# Patient Record
Sex: FEMALE | Race: WHITE | NOT HISPANIC OR LATINO | Employment: FULL TIME | URBAN - METROPOLITAN AREA
[De-identification: names, ages, dates, MRNs, and addresses within clinical notes are randomized per-mention and may not be internally consistent; named-entity substitution may affect disease eponyms.]

---

## 2017-02-01 ENCOUNTER — APPOINTMENT (OUTPATIENT)
Dept: LAB | Facility: HOSPITAL | Age: 37
End: 2017-02-01
Payer: COMMERCIAL

## 2017-02-01 ENCOUNTER — TRANSCRIBE ORDERS (OUTPATIENT)
Dept: ADMINISTRATIVE | Facility: HOSPITAL | Age: 37
End: 2017-02-01

## 2017-02-01 DIAGNOSIS — R30.0 DYSURIA: Primary | ICD-10-CM

## 2017-02-01 DIAGNOSIS — R30.0 DYSURIA: ICD-10-CM

## 2017-02-01 DIAGNOSIS — N20.9 URINARY CALCULUS, UNSPECIFIED: ICD-10-CM

## 2017-02-01 LAB
BACTERIA UR QL AUTO: ABNORMAL /HPF
BILIRUB UR QL STRIP: NEGATIVE
CLARITY UR: CLEAR
COLOR UR: YELLOW
GLUCOSE UR STRIP-MCNC: NEGATIVE MG/DL
HGB UR QL STRIP.AUTO: ABNORMAL
HYALINE CASTS #/AREA URNS LPF: ABNORMAL /LPF
KETONES UR STRIP-MCNC: NEGATIVE MG/DL
LEUKOCYTE ESTERASE UR QL STRIP: NEGATIVE
NITRITE UR QL STRIP: NEGATIVE
NON-SQ EPI CELLS URNS QL MICRO: ABNORMAL /HPF
PH UR STRIP.AUTO: 6 [PH] (ref 4.5–8)
PROT UR STRIP-MCNC: NEGATIVE MG/DL
RBC #/AREA URNS AUTO: ABNORMAL /HPF
SP GR UR STRIP.AUTO: 1.02 (ref 1–1.03)
UROBILINOGEN UR QL STRIP.AUTO: 0.2 E.U./DL
WBC #/AREA URNS AUTO: ABNORMAL /HPF

## 2017-02-01 PROCEDURE — 87086 URINE CULTURE/COLONY COUNT: CPT

## 2017-02-01 PROCEDURE — 81001 URINALYSIS AUTO W/SCOPE: CPT | Performed by: INTERNAL MEDICINE

## 2017-02-02 ENCOUNTER — HOSPITAL ENCOUNTER (OUTPATIENT)
Dept: RADIOLOGY | Facility: HOSPITAL | Age: 37
Discharge: HOME | End: 2017-02-02
Payer: COMMERCIAL

## 2017-02-02 DIAGNOSIS — R30.0 DYSURIA: ICD-10-CM

## 2017-02-02 LAB — BACTERIA UR CULT: NORMAL

## 2017-02-02 PROCEDURE — 76830 TRANSVAGINAL US NON-OB: CPT

## 2017-02-02 PROCEDURE — 76856 US EXAM PELVIC COMPLETE: CPT

## 2017-02-07 ENCOUNTER — HOSPITAL ENCOUNTER (OUTPATIENT)
Dept: RADIOLOGY | Age: 37
Discharge: HOME | End: 2017-02-07
Payer: COMMERCIAL

## 2017-02-07 DIAGNOSIS — N20.9 URINARY CALCULUS, UNSPECIFIED: ICD-10-CM

## 2017-02-07 PROCEDURE — 76770 US EXAM ABDO BACK WALL COMP: CPT

## 2018-03-10 ENCOUNTER — APPOINTMENT (OUTPATIENT)
Dept: LAB | Facility: HOSPITAL | Age: 38
End: 2018-03-10
Payer: COMMERCIAL

## 2018-03-10 ENCOUNTER — TRANSCRIBE ORDERS (OUTPATIENT)
Dept: LAB | Facility: HOSPITAL | Age: 38
End: 2018-03-10

## 2018-03-10 DIAGNOSIS — I51.9 MYXEDEMA HEART DISEASE: Primary | ICD-10-CM

## 2018-03-10 DIAGNOSIS — E03.9 MYXEDEMA HEART DISEASE: ICD-10-CM

## 2018-03-10 DIAGNOSIS — E03.9 MYXEDEMA HEART DISEASE: Primary | ICD-10-CM

## 2018-03-10 DIAGNOSIS — I51.9 MYXEDEMA HEART DISEASE: ICD-10-CM

## 2018-03-10 LAB
ALBUMIN SERPL BCP-MCNC: 3.6 G/DL (ref 3.5–5)
ALP SERPL-CCNC: 102 U/L (ref 46–116)
ALT SERPL W P-5'-P-CCNC: 19 U/L (ref 12–78)
ANION GAP SERPL CALCULATED.3IONS-SCNC: 5 MMOL/L (ref 4–13)
AST SERPL W P-5'-P-CCNC: 13 U/L (ref 5–45)
BILIRUB SERPL-MCNC: 0.48 MG/DL (ref 0.2–1)
BILIRUB UR QL STRIP: NEGATIVE
BUN SERPL-MCNC: 15 MG/DL (ref 5–25)
CALCIUM SERPL-MCNC: 8.7 MG/DL (ref 8.3–10.1)
CHLORIDE SERPL-SCNC: 106 MMOL/L (ref 100–108)
CLARITY UR: CLEAR
CO2 SERPL-SCNC: 29 MMOL/L (ref 21–32)
COLOR UR: YELLOW
CREAT SERPL-MCNC: 0.73 MG/DL (ref 0.6–1.3)
ERYTHROCYTE [DISTWIDTH] IN BLOOD BY AUTOMATED COUNT: 14.1 % (ref 11.6–15.1)
GFR SERPL CREATININE-BSD FRML MDRD: 106 ML/MIN/1.73SQ M
GLUCOSE P FAST SERPL-MCNC: 87 MG/DL (ref 65–99)
GLUCOSE UR STRIP-MCNC: NEGATIVE MG/DL
HCT VFR BLD AUTO: 44 % (ref 34.8–46.1)
HGB BLD-MCNC: 15 G/DL (ref 11.5–15.4)
HGB UR QL STRIP.AUTO: NEGATIVE
KETONES UR STRIP-MCNC: NEGATIVE MG/DL
LEUKOCYTE ESTERASE UR QL STRIP: NEGATIVE
MCH RBC QN AUTO: 30.3 PG (ref 26.8–34.3)
MCHC RBC AUTO-ENTMCNC: 34.1 G/DL (ref 31.4–37.4)
MCV RBC AUTO: 89 FL (ref 82–98)
NITRITE UR QL STRIP: NEGATIVE
PH UR STRIP.AUTO: 6.5 [PH] (ref 4.5–8)
PLATELET # BLD AUTO: 290 THOUSANDS/UL (ref 149–390)
PMV BLD AUTO: 10.9 FL (ref 8.9–12.7)
POTASSIUM SERPL-SCNC: 4.3 MMOL/L (ref 3.5–5.3)
PROT SERPL-MCNC: 7.4 G/DL (ref 6.4–8.2)
PROT UR STRIP-MCNC: NEGATIVE MG/DL
RBC # BLD AUTO: 4.95 MILLION/UL (ref 3.81–5.12)
SODIUM SERPL-SCNC: 140 MMOL/L (ref 136–145)
SP GR UR STRIP.AUTO: 1.02 (ref 1–1.03)
TSH SERPL DL<=0.05 MIU/L-ACNC: 1.36 UIU/ML (ref 0.36–3.74)
UROBILINOGEN UR QL STRIP.AUTO: 0.2 E.U./DL
WBC # BLD AUTO: 11.02 THOUSAND/UL (ref 4.31–10.16)

## 2018-03-10 PROCEDURE — 80053 COMPREHEN METABOLIC PANEL: CPT

## 2018-03-10 PROCEDURE — 81003 URINALYSIS AUTO W/O SCOPE: CPT

## 2018-03-10 PROCEDURE — 84443 ASSAY THYROID STIM HORMONE: CPT

## 2018-03-10 PROCEDURE — 36415 COLL VENOUS BLD VENIPUNCTURE: CPT

## 2018-03-10 PROCEDURE — 85027 COMPLETE CBC AUTOMATED: CPT

## 2018-08-13 ENCOUNTER — APPOINTMENT (EMERGENCY)
Dept: RADIOLOGY | Facility: HOSPITAL | Age: 38
DRG: 153 | End: 2018-08-13
Payer: COMMERCIAL

## 2018-08-13 ENCOUNTER — HOSPITAL ENCOUNTER (INPATIENT)
Facility: HOSPITAL | Age: 38
LOS: 2 days | Discharge: HOME | DRG: 153 | End: 2018-08-16
Attending: EMERGENCY MEDICINE | Admitting: INTERNAL MEDICINE
Payer: COMMERCIAL

## 2018-08-13 DIAGNOSIS — J45.901 ASTHMA WITH ACUTE EXACERBATION: ICD-10-CM

## 2018-08-13 DIAGNOSIS — J45.901 ASTHMA EXACERBATION, NON-ALLERGIC, UNSPECIFIED ASTHMA SEVERITY: Primary | ICD-10-CM

## 2018-08-13 DIAGNOSIS — J06.9 URI, ACUTE: ICD-10-CM

## 2018-08-13 PROBLEM — E66.9 OBESE: Status: ACTIVE | Noted: 2018-08-13

## 2018-08-13 PROBLEM — R73.9 HYPERGLYCEMIA: Status: ACTIVE | Noted: 2018-08-13

## 2018-08-13 PROBLEM — Z72.0 TOBACCO ABUSE: Status: ACTIVE | Noted: 2018-08-13

## 2018-08-13 PROBLEM — R05.9 COUGH: Status: ACTIVE | Noted: 2018-08-13

## 2018-08-13 LAB
ANION GAP SERPL CALCULATED.3IONS-SCNC: 7 MMOL/L (ref 4–13)
BASOPHILS # BLD AUTO: 0.07 THOUSANDS/ΜL (ref 0–0.1)
BASOPHILS NFR BLD AUTO: 1 % (ref 0–1)
BUN SERPL-MCNC: 18 MG/DL (ref 5–25)
CALCIUM SERPL-MCNC: 8.7 MG/DL (ref 8.3–10.1)
CHLORIDE SERPL-SCNC: 107 MMOL/L (ref 100–108)
CO2 SERPL-SCNC: 25 MMOL/L (ref 21–32)
CREAT SERPL-MCNC: 0.8 MG/DL (ref 0.6–1.3)
EOSINOPHIL # BLD AUTO: 0.13 THOUSAND/ΜL (ref 0–0.61)
EOSINOPHIL NFR BLD AUTO: 1 % (ref 0–6)
ERYTHROCYTE [DISTWIDTH] IN BLOOD BY AUTOMATED COUNT: 14.3 % (ref 11.6–15.1)
GFR SERPL CREATININE-BSD FRML MDRD: 94 ML/MIN/1.73SQ M
GLUCOSE SERPL-MCNC: 155 MG/DL (ref 65–140)
GLUCOSE SERPL-MCNC: 184 MG/DL (ref 65–140)
HCT VFR BLD AUTO: 43.7 % (ref 34.8–46.1)
HGB BLD-MCNC: 14.5 G/DL (ref 11.5–15.4)
IMM GRANULOCYTES # BLD AUTO: 0.03 THOUSAND/UL (ref 0–0.2)
IMM GRANULOCYTES NFR BLD AUTO: 0 % (ref 0–2)
LYMPHOCYTES # BLD AUTO: 2.25 THOUSANDS/ΜL (ref 0.6–4.47)
LYMPHOCYTES NFR BLD AUTO: 23 % (ref 14–44)
MCH RBC QN AUTO: 29.6 PG (ref 26.8–34.3)
MCHC RBC AUTO-ENTMCNC: 33.2 G/DL (ref 31.4–37.4)
MCV RBC AUTO: 89 FL (ref 82–98)
MONOCYTES # BLD AUTO: 0.44 THOUSAND/ΜL (ref 0.17–1.22)
MONOCYTES NFR BLD AUTO: 5 % (ref 4–12)
NEUTROPHILS # BLD AUTO: 6.75 THOUSANDS/ΜL (ref 1.85–7.62)
NEUTS SEG NFR BLD AUTO: 70 % (ref 43–75)
NRBC BLD AUTO-RTO: 0 /100 WBCS
PLATELET # BLD AUTO: 262 THOUSANDS/UL (ref 149–390)
PLATELET # BLD AUTO: 287 THOUSANDS/UL (ref 149–390)
PMV BLD AUTO: 11.1 FL (ref 8.9–12.7)
PMV BLD AUTO: 11.1 FL (ref 8.9–12.7)
POTASSIUM SERPL-SCNC: 3.5 MMOL/L (ref 3.5–5.3)
RBC # BLD AUTO: 4.9 MILLION/UL (ref 3.81–5.12)
SODIUM SERPL-SCNC: 139 MMOL/L (ref 136–145)
WBC # BLD AUTO: 9.67 THOUSAND/UL (ref 4.31–10.16)

## 2018-08-13 PROCEDURE — 71046 X-RAY EXAM CHEST 2 VIEWS: CPT

## 2018-08-13 PROCEDURE — 71250 CT THORAX DX C-: CPT

## 2018-08-13 PROCEDURE — 85049 AUTOMATED PLATELET COUNT: CPT | Performed by: INTERNAL MEDICINE

## 2018-08-13 PROCEDURE — 87493 C DIFF AMPLIFIED PROBE: CPT | Performed by: INTERNAL MEDICINE

## 2018-08-13 PROCEDURE — 96374 THER/PROPH/DIAG INJ IV PUSH: CPT

## 2018-08-13 PROCEDURE — 85025 COMPLETE CBC W/AUTO DIFF WBC: CPT | Performed by: PHYSICIAN ASSISTANT

## 2018-08-13 PROCEDURE — 81025 URINE PREGNANCY TEST: CPT | Performed by: EMERGENCY MEDICINE

## 2018-08-13 PROCEDURE — 94640 AIRWAY INHALATION TREATMENT: CPT

## 2018-08-13 PROCEDURE — 36415 COLL VENOUS BLD VENIPUNCTURE: CPT | Performed by: PHYSICIAN ASSISTANT

## 2018-08-13 PROCEDURE — 94760 N-INVAS EAR/PLS OXIMETRY 1: CPT

## 2018-08-13 PROCEDURE — 99220 PR INITIAL OBSERVATION CARE/DAY 70 MINUTES: CPT | Performed by: INTERNAL MEDICINE

## 2018-08-13 PROCEDURE — 99285 EMERGENCY DEPT VISIT HI MDM: CPT

## 2018-08-13 PROCEDURE — 82948 REAGENT STRIP/BLOOD GLUCOSE: CPT

## 2018-08-13 PROCEDURE — 80048 BASIC METABOLIC PNL TOTAL CA: CPT | Performed by: PHYSICIAN ASSISTANT

## 2018-08-13 RX ORDER — ALBUTEROL SULFATE 90 UG/1
2 AEROSOL, METERED RESPIRATORY (INHALATION) EVERY 4 HOURS PRN
Status: DISCONTINUED | OUTPATIENT
Start: 2018-08-13 | End: 2018-08-16 | Stop reason: HOSPADM

## 2018-08-13 RX ORDER — ACETAMINOPHEN 325 MG/1
650 TABLET ORAL EVERY 6 HOURS PRN
Status: DISCONTINUED | OUTPATIENT
Start: 2018-08-13 | End: 2018-08-16 | Stop reason: HOSPADM

## 2018-08-13 RX ORDER — ALBUTEROL SULFATE 2.5 MG/3ML
5 SOLUTION RESPIRATORY (INHALATION) ONCE
Status: COMPLETED | OUTPATIENT
Start: 2018-08-13 | End: 2018-08-13

## 2018-08-13 RX ORDER — BENZONATATE 100 MG/1
100 CAPSULE ORAL 3 TIMES DAILY PRN
Status: DISCONTINUED | OUTPATIENT
Start: 2018-08-13 | End: 2018-08-16 | Stop reason: HOSPADM

## 2018-08-13 RX ORDER — ALBUTEROL SULFATE 90 UG/1
2 AEROSOL, METERED RESPIRATORY (INHALATION) EVERY 6 HOURS PRN
Status: DISCONTINUED | OUTPATIENT
Start: 2018-08-13 | End: 2018-08-13

## 2018-08-13 RX ORDER — GUAIFENESIN 600 MG
600 TABLET, EXTENDED RELEASE 12 HR ORAL EVERY 12 HOURS SCHEDULED
Status: DISCONTINUED | OUTPATIENT
Start: 2018-08-13 | End: 2018-08-16 | Stop reason: HOSPADM

## 2018-08-13 RX ORDER — LEVALBUTEROL 1.25 MG/.5ML
1.25 SOLUTION, CONCENTRATE RESPIRATORY (INHALATION) EVERY 8 HOURS PRN
Status: DISCONTINUED | OUTPATIENT
Start: 2018-08-13 | End: 2018-08-16 | Stop reason: HOSPADM

## 2018-08-13 RX ORDER — AZITHROMYCIN 250 MG/1
250 TABLET, FILM COATED ORAL EVERY 24 HOURS
COMMUNITY
End: 2018-08-16 | Stop reason: HOSPADM

## 2018-08-13 RX ORDER — NICOTINE 21 MG/24HR
1 PATCH, TRANSDERMAL 24 HOURS TRANSDERMAL DAILY
Status: DISCONTINUED | OUTPATIENT
Start: 2018-08-13 | End: 2018-08-16 | Stop reason: HOSPADM

## 2018-08-13 RX ORDER — ONDANSETRON 2 MG/ML
4 INJECTION INTRAMUSCULAR; INTRAVENOUS EVERY 6 HOURS PRN
Status: DISCONTINUED | OUTPATIENT
Start: 2018-08-13 | End: 2018-08-16 | Stop reason: HOSPADM

## 2018-08-13 RX ORDER — LEVALBUTEROL 1.25 MG/.5ML
1.25 SOLUTION, CONCENTRATE RESPIRATORY (INHALATION)
Status: DISCONTINUED | OUTPATIENT
Start: 2018-08-13 | End: 2018-08-16 | Stop reason: HOSPADM

## 2018-08-13 RX ORDER — MAGNESIUM HYDROXIDE/ALUMINUM HYDROXICE/SIMETHICONE 120; 1200; 1200 MG/30ML; MG/30ML; MG/30ML
5 SUSPENSION ORAL EVERY 6 HOURS PRN
Status: DISCONTINUED | OUTPATIENT
Start: 2018-08-13 | End: 2018-08-16 | Stop reason: HOSPADM

## 2018-08-13 RX ORDER — SODIUM CHLORIDE FOR INHALATION 0.9 %
3 VIAL, NEBULIZER (ML) INHALATION ONCE
Status: COMPLETED | OUTPATIENT
Start: 2018-08-13 | End: 2018-08-13

## 2018-08-13 RX ORDER — DEXAMETHASONE SODIUM PHOSPHATE 10 MG/ML
10 INJECTION, SOLUTION INTRAMUSCULAR; INTRAVENOUS ONCE
Status: COMPLETED | OUTPATIENT
Start: 2018-08-13 | End: 2018-08-13

## 2018-08-13 RX ORDER — TRAMADOL HYDROCHLORIDE 50 MG/1
50 TABLET ORAL EVERY 6 HOURS PRN
Status: DISCONTINUED | OUTPATIENT
Start: 2018-08-13 | End: 2018-08-16 | Stop reason: HOSPADM

## 2018-08-13 RX ORDER — VARENICLINE TARTRATE 25 MG
1 KIT ORAL 2 TIMES DAILY
COMMUNITY
End: 2020-03-18 | Stop reason: CLARIF

## 2018-08-13 RX ORDER — DOCUSATE SODIUM 100 MG/1
100 CAPSULE, LIQUID FILLED ORAL 2 TIMES DAILY
Status: DISCONTINUED | OUTPATIENT
Start: 2018-08-13 | End: 2018-08-16 | Stop reason: HOSPADM

## 2018-08-13 RX ORDER — ALBUTEROL SULFATE 90 UG/1
2 AEROSOL, METERED RESPIRATORY (INHALATION) EVERY 6 HOURS PRN
COMMUNITY
End: 2020-03-18 | Stop reason: CLARIF

## 2018-08-13 RX ORDER — METHYLPREDNISOLONE SODIUM SUCCINATE 40 MG/ML
40 INJECTION, POWDER, LYOPHILIZED, FOR SOLUTION INTRAMUSCULAR; INTRAVENOUS EVERY 8 HOURS SCHEDULED
Status: DISCONTINUED | OUTPATIENT
Start: 2018-08-13 | End: 2018-08-14

## 2018-08-13 RX ADMIN — ISODIUM CHLORIDE 3 ML: 0.03 SOLUTION RESPIRATORY (INHALATION) at 09:52

## 2018-08-13 RX ADMIN — ALBUTEROL SULFATE 5 MG: 2.5 SOLUTION RESPIRATORY (INHALATION) at 12:45

## 2018-08-13 RX ADMIN — IPRATROPIUM BROMIDE 0.5 MG: 0.5 SOLUTION RESPIRATORY (INHALATION) at 12:45

## 2018-08-13 RX ADMIN — IPRATROPIUM BROMIDE 1 MG: 0.5 SOLUTION RESPIRATORY (INHALATION) at 09:50

## 2018-08-13 RX ADMIN — DEXAMETHASONE SODIUM PHOSPHATE 10 MG: 10 INJECTION, SOLUTION INTRAMUSCULAR; INTRAVENOUS at 09:43

## 2018-08-13 RX ADMIN — LEVALBUTEROL HYDROCHLORIDE 1.25 MG: 1.25 SOLUTION, CONCENTRATE RESPIRATORY (INHALATION) at 20:35

## 2018-08-13 RX ADMIN — GUAIFENESIN 600 MG: 600 TABLET, EXTENDED RELEASE ORAL at 20:47

## 2018-08-13 RX ADMIN — METHYLPREDNISOLONE SODIUM SUCCINATE 40 MG: 40 INJECTION, POWDER, FOR SOLUTION INTRAMUSCULAR; INTRAVENOUS at 20:47

## 2018-08-13 RX ADMIN — IPRATROPIUM BROMIDE 0.5 MG: 0.5 SOLUTION RESPIRATORY (INHALATION) at 20:35

## 2018-08-13 RX ADMIN — ALBUTEROL SULFATE 10 MG: 2.5 SOLUTION RESPIRATORY (INHALATION) at 09:49

## 2018-08-13 RX ADMIN — ACETAMINOPHEN 650 MG: 325 TABLET, FILM COATED ORAL at 23:07

## 2018-08-13 NOTE — MEDICAL STUDENT
MEDICAL STUDENT  Inpatient H&P for TRAINING ONLY   Not Part of Legal Medical Record     INTERNAL MEDICINE HISTORY AND PHYSICAL  QCB     NAME: Gio Oscar  AGE: 45 y o  SEX: female  : 1980   MRN: [id number]  ENCOUNTER: [id number]    DATE: 2018  TIME: 1:59 PM    Primary Care Physician: Donta Mahajan MD  Admitting Provider: Javy Mcgowan MD    Assessment Anjum Thomson  1  Cough 2/2 viral infection vs asthma exacerbation: voice change not typical for asthma  Symptoms not improved with albuterol at home  Minimal change with Atrovent and decadron given in ED  CXR and CT scan were normal in ED  Patient is a current smoker    -Likely viral in nature given no benefit from antibiotics  Follow-up with PCP outpatient   -Follow-up outpatient Pulmonology once resolved for baseline pulmonary function tests       Chief complaint: voice hoarseness    HPI  Gio Oscar is a 45 y o  female past medical history of asthma  Patient woke up last Wednesday with no voice, associated with fevers, cough, congestion, runny nose  Cough is worse with exertion  Patient was seen by PCP who prescribed z-pac with no benefit  In addition, patient has found that her albuterol inhaler has not helped  Patient's  has been sick and is also here in the ED  She is a current smoker of 1/2 pack per day  ROS  Review of Systems   Constitutional: Positive for fever  HENT: Positive for congestion, rhinorrhea and voice change  Eyes: Negative  Respiratory: Positive for cough, shortness of breath and wheezing  Cardiovascular: Negative  Gastrointestinal: Negative  Endocrine: Negative  Genitourinary: Negative  Musculoskeletal: Negative  Skin: Negative  Allergic/Immunologic: Negative  Neurological: Negative  Hematological: Negative  Psychiatric/Behavioral: Negative          PMH  Past Medical History:   Diagnosis Date    Asthma     Kidney stones        PSH  Past Surgical History:   Procedure Laterality Date    CHOLECYSTECTOMY      CHOLECYSTECTOMY         Social History  History   Alcohol Use No     History   Drug Use No     History   Smoking Status    Current Every Day Smoker    Packs/day: 0 50   Smokeless Tobacco    Not on file       Family History   History reviewed  No pertinent family history  Medications Prior to Admission  Prior to Admission medications    Medication Sig Start Date End Date Taking? Authorizing Provider   albuterol (PROVENTIL HFA,VENTOLIN HFA) 90 mcg/act inhaler Inhale 2 puffs every 6 (six) hours as needed for wheezing   Yes Historical Provider, MD   azithromycin (ZITHROMAX) 250 mg tablet Take 250 mg by mouth every 24 hours Patient is on day 3 of zpack   Yes Historical Provider, MD   capsaicin (ZOSTRIX) 0 025 % cream Apply 1 application topically 2 (two) times a day  7/12/16 8/13/18  Dona Mt   methocarbamol (ROBAXIN) 500 mg tablet Take 1 tablet (500 mg total) by mouth 2 (two) times a day  7/12/16 8/13/18  Dona Mt   naproxen (NAPROSYN) 500 mg tablet Take 1 tablet (500 mg total) by mouth 2 (two) times a day with meals  7/12/16 8/13/18  Doug Stubbs       Allergies  Allergies   Allergen Reactions    Erythromycin Hives       Objective  Vitals:    08/13/18 0915 08/13/18 0916 08/13/18 1152   BP: 135/84  120/60   BP Location:   Right arm   Pulse: 90  79   Resp: 16  16   Temp:  98 5 °F (36 9 °C)    TempSrc:  Oral    SpO2: 93%  97%     There is no height or weight on file to calculate BMI  No intake or output data in the 24 hours ending 08/13/18 1359  Invasive Devices          No matching active lines, drains, or airways          Physical Exam: Physical Exam   Constitutional: She is oriented to person, place, and time  She appears well-developed and well-nourished  No distress  HENT:   Head: Normocephalic and atraumatic  Right Ear: External ear normal    Left Ear: External ear normal    Nose: Mucosal edema present     Mouth/Throat: Uvula is midline and mucous membranes are normal  Posterior oropharyngeal edema and posterior oropharyngeal erythema present  Eyes: Conjunctivae and EOM are normal  Pupils are equal, round, and reactive to light  Neck: Normal range of motion  Neck supple  Cardiovascular: Normal rate, regular rhythm and normal heart sounds  Pulmonary/Chest: Effort normal  She has wheezes  Abdominal: Soft  Bowel sounds are normal  She exhibits no distension  There is no tenderness  Genitourinary:   Genitourinary Comments: deferred   Musculoskeletal: Normal range of motion  Neurological: She is alert and oriented to person, place, and time  Skin: Skin is warm and dry  She is not diaphoretic  Psychiatric: She has a normal mood and affect  Nursing note and vitals reviewed  Lab Results: I have personally reviewed pertinent reports  CBC:   Results from last 7 days  Lab Units 08/13/18  1112   WBC Thousand/uL 9 67   RBC Million/uL 4 90   HEMOGLOBIN g/dL 14 5   HEMATOCRIT % 43 7   MCV fL 89   MCH pg 29 6   MCHC g/dL 33 2   RDW % 14 3   MPV fL 11 1   PLATELETS Thousands/uL 262   NRBC AUTO /100 WBCs 0   NEUTROS PCT % 70   LYMPHS PCT % 23   MONOS PCT % 5   EOS PCT % 1   BASOS PCT % 1   NEUTROS ABS Thousands/µL 6 75   LYMPHS ABS Thousands/µL 2 25   MONOS ABS Thousand/µL 0 44   EOS ABS Thousand/µL 0 13   , Chemistry Profile:   Results from last 7 days  Lab Units 08/13/18  1111   SODIUM mmol/L 139   POTASSIUM mmol/L 3 5   CHLORIDE mmol/L 107   CO2 mmol/L 25   ANION GAP mmol/L 7   BUN mg/dL 18   CREATININE mg/dL 0 80   GLUCOSE RANDOM mg/dL 155*   CALCIUM mg/dL 8 7   EGFR ml/min/1 73sq m 94   , Cardiac Studies:   , Additional Labs:       Imaging: I have personally reviewed pertinent films in PACS  Xr Chest 2 Views    Result Date: 8/13/2018  Narrative: CHEST INDICATION:   cough x 1 week  COMPARISON:  Chest x-ray 12/10/2014 EXAM PERFORMED/VIEWS:  XR CHEST PA & LATERAL FINDINGS: Cardiomediastinal silhouette appears unremarkable  The lungs are clear  No pneumothorax or pleural effusion  Osseous structures appear within normal limits for patient age  Impression: No acute cardiopulmonary disease  Workstation performed: [id number]     Ct Chest Without Contrast    Result Date: 8/13/2018  Narrative: CT CHEST WITHOUT IV CONTRAST INDICATION:   cough x 1 week, r/o pneumonia  COMPARISON:  9/29/2011 TECHNIQUE: CT examination of the chest was performed without intravenous contrast   Axial, sagittal, and coronal 2D reformatted images were created from the source data and submitted for interpretation  Radiation dose length product (DLP) for this visit:  621 mGy-cm   This examination, like all CT scans performed in the [facility], was performed utilizing techniques to minimize radiation dose exposure, including the use of iterative reconstruction and automated exposure control  FINDINGS: LUNGS:  Lungs are clear  There is no tracheal or endobronchial lesion  PLEURA:  Unremarkable  HEART/GREAT VESSELS:  Unremarkable for patient's age  MEDIASTINUM AND HALLEY:  Unremarkable  CHEST WALL AND LOWER NECK:   Unremarkable  VISUALIZED STRUCTURES IN THE UPPER ABDOMEN:  2 mm nonobstructing left renal calculus is noted  OSSEOUS STRUCTURES:  No acute fracture or destructive osseous lesion  Impression: No evidence of pneumonia  Nonobstructing left renal calculus  Workstation performed: [id number]       EKG, Pathology, and Other Studies: I have personally reviewed pertinent reports        Code Status: Level 1 - Full Code  VTE Pharmacologic Prophylaxis: Sequential compression device (Venodyne)    VTE Mechanical Prophylaxis: sequential compression device    Esta Hem  MS3

## 2018-08-13 NOTE — H&P
Progress Note - Gio Oscar 1980, 45 y o  female MRN: [id number]    Unit/Bed#: QCB Encounter: [id number]    Primary Care Provider: Donta Mahajan MD   Date and time admitted to hospital: 2018  9:18 AM        * Asthma with acute exacerbation   Assessment & Plan    Likely asthma exacerbation precipitated by a viral infection will provide her with IV Solu-Medrol, respiratory treatments, supportive care  Check respiratory pathogen PCR  Request Pulmonary inputs        Cough   Assessment & Plan    With laryngitis likely due to 1 above will provide her with supportive care        Hyperglycemia   Assessment & Plan    Check A1c, monitor Accu-Cheks        Tobacco abuse   Assessment & Plan    Now on Chantix  Counseled        Obese   Assessment & Plan    Therapeutic lifestyle modification discussed            VTE Prophylaxis: Enoxaparin (Lovenox)  / sequential compression device   Code Status:  Full code  POLST: There is no POLST form on file for this patient (pre-hospital)  Discussion with family:  Discussed with the patient,  is getting admitted for similar symptoms updated    Anticipated Length of Stay:  Patient will be admitted on an Observation basis with an anticipated length of stay of  Less than 2 midnights  Chief Complaint:       Hoarse voice, exertional dyspnea, cough    History of Present Illness:    Gio Oscar is a 45 y o  female who presents with hoarseness of voice exertional dyspnea chest tightness cough  Patient has history of asthma and uses albuterol as needed, she reports her  was not feeling well with cough and chest tightness and passed on the infection to her  Patient initially developed hoarseness of voice and has completed a course of azithromycin, however her symptoms worsened, she also developed worsening exertional shortness of breath and chest tightness  She reports she is unable to take deep breaths or     She has having a cough associated scanty mucoid sputum  She had an episode of fever few days back  Her appetite is fair to good  No history of arthritis arthralgia myalgia, no history of red eyes  Denies chest pain diaphoresis palpitations presyncope syncope  Denies abdominal pain nausea vomiting or diarrhea  Denies urinary symptoms  Review of Systems:    Review of Systems   All other systems reviewed and are negative  Past Medical and Surgical History:     Past Medical History:   Diagnosis Date    Asthma     Kidney stones        Past Surgical History:   Procedure Laterality Date    CHOLECYSTECTOMY      CHOLECYSTECTOMY         Meds/Allergies:    Prior to Admission medications    Medication Sig Start Date End Date Taking? Authorizing Provider   albuterol (PROVENTIL HFA,VENTOLIN HFA) 90 mcg/act inhaler Inhale 2 puffs every 6 (six) hours as needed for wheezing   Yes Historical Provider, MD   azithromycin (ZITHROMAX) 250 mg tablet Take 250 mg by mouth every 24 hours Patient is on day 3 of zpack   Yes Historical Provider, MD   capsaicin (ZOSTRIX) 0 025 % cream Apply 1 application topically 2 (two) times a day  7/12/16 8/13/18  Dione James   methocarbamol (ROBAXIN) 500 mg tablet Take 1 tablet (500 mg total) by mouth 2 (two) times a day  7/12/16 8/13/18  Dione James   naproxen (NAPROSYN) 500 mg tablet Take 1 tablet (500 mg total) by mouth 2 (two) times a day with meals  7/12/16 8/13/18  Dione James     I have reviewed home medications with patient personally  Allergies:    Allergies   Allergen Reactions    Erythromycin Hives       Social History:     Marital Status: /Civil Union   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  Patient Pre-hospital Living Situation: home  Patient Pre-hospital Level of Mobility:  Independent  Patient Pre-hospital Diet Restrictions: no  Substance Use History:   History   Alcohol Use No     History   Smoking Status    Current Every Day Smoker    Packs/day: 0 50   Smokeless Tobacco  Not on file     History   Drug Use No       Family History:    History reviewed  No pertinent family history  Physical Exam:     Vitals:   Blood Pressure: 120/60 (08/13/18 1152)  Pulse: 79 (08/13/18 1152)  Temperature: 98 5 °F (36 9 °C) (08/13/18 0916)  Temp Source: Oral (08/13/18 0916)  Respirations: 16 (08/13/18 1152)  SpO2: 97 % (08/13/18 1152)    Physical Exam    Obese  Hoarse voice noted  Short thick neck  Lungs bilateral rhonchi, diminished breath sounds bilaterally  Heart sounds distant S1 and S2 noted  Abdomen soft nontender  Pulses present  Awake alert obeys simple commands  No rash  No pedal edema    Additional Data:     Lab Results: I have personally reviewed pertinent reports  Results from last 7 days  Lab Units 08/13/18  1112   WBC Thousand/uL 9 67   HEMOGLOBIN g/dL 14 5   HEMATOCRIT % 43 7   PLATELETS Thousands/uL 262   NEUTROS PCT % 70   LYMPHS PCT % 23   MONOS PCT % 5   EOS PCT % 1       Results from last 7 days  Lab Units 08/13/18  1111   SODIUM mmol/L 139   POTASSIUM mmol/L 3 5   CHLORIDE mmol/L 107   CO2 mmol/L 25   BUN mg/dL 18   CREATININE mg/dL 0 80   CALCIUM mg/dL 8 7   GLUCOSE RANDOM mg/dL 155*       Imaging: I have personally reviewed pertinent reports  Chest CT scan personally reviewed no active lung disease noted    CT chest without contrast   Final Result by Pedro Valdes MD (08/13 1319)      No evidence of pneumonia  Nonobstructing left renal calculus  Workstation performed: [id number]         XR chest 2 views   Final Result by Dheeraj Stapleton MD (08/13 1106)      No acute cardiopulmonary disease  Workstation performed: [id number]             Allscripts / Airpush Records Reviewed: Yes     ** Please Note: This note has been constructed using a voice recognition system   **

## 2018-08-13 NOTE — LETTER
[address]  [address] [place] [address]  Dept: [phone]    August 16, 2018     Patient: Syd Stanley   YOB: 1980   Date of Visit: 8/13/2018       To Whom it May Concern:    Syd Stanley is under my professional care  She was seen in the hospital from 8/13/2018   to 08/16/18  She may return to work on Monday, August 20, 2018  If you have any questions or concerns, please don't hesitate to call           Sincerely,          Charlie Nazario, DO

## 2018-08-13 NOTE — ED PROVIDER NOTES
History  Chief Complaint   Patient presents with    Cough     Progressive cough since Last week  Patient recently lost voice  Fever last week  17-year-old female with history of asthma presents for evaluation of ache cough for the past 1 week  Patient reports that 1 week ago she started losing her voice  States that 4 days ago she was placed on a Z-Kip and has not noticed any improvement  States that she was around her  who had similar symptoms for the past few weeks  She also states that she has congestion, productive cough, facial pressure, wheezing and shortness of breath with exertion and occasional vomiting  Admits to subjective fever  Patient states that she has been doing her nebulizer and albuterol inhaler every 4-6 hours last dose was 4 5 hours ago  She is also a smoker  Cough   Cough characteristics:  Productive and barking  Sputum characteristics:  Yellow  Severity:  Moderate  Onset quality:  Sudden  Duration:  1 week  Timing:  Constant  Progression:  Worsening  Chronicity:  New  Smoker: yes (1/2 pack per day)    Context: sick contacts and upper respiratory infection    Context: not animal exposure, not exposure to allergens, not fumes, not occupational exposure, not smoke exposure, not weather changes and not with activity    Relieved by:  Home nebulizer  Worsened by: Activity and smoking  Associated symptoms: fever (resolved in 2 days), rhinorrhea, shortness of breath, sinus congestion and wheezing    Associated symptoms: no chest pain, no chills, no ear fullness, no ear pain, no eye discharge, no headaches, no myalgias, no rash, no sore throat and no weight loss        Prior to Admission Medications   Prescriptions Last Dose Informant Patient Reported? Taking?    albuterol (PROVENTIL HFA,VENTOLIN HFA) 90 mcg/act inhaler   Yes Yes   Sig: Inhale 2 puffs every 6 (six) hours as needed for wheezing   azithromycin (ZITHROMAX) 250 mg tablet   Yes Yes   Sig: Take 250 mg by mouth every 24 hours Patient is on day [facility] of [facility]      Facility-Administered Medications: None       Past Medical History:   Diagnosis Date    Asthma     Kidney stones        Past Surgical History:   Procedure Laterality Date    CHOLECYSTECTOMY      CHOLECYSTECTOMY         History reviewed  No pertinent family history  I have reviewed and agree with the history as documented  Social History   Substance Use Topics    Smoking status: Current Every Day Smoker     Packs/day: 0 50    Smokeless tobacco: Not on file    Alcohol use No        Review of Systems   Constitutional: Positive for fever (resolved in 2 days)  Negative for chills and weight loss  HENT: Positive for rhinorrhea  Negative for ear pain and sore throat  Eyes: Negative for discharge  Respiratory: Positive for cough, shortness of breath and wheezing  Cardiovascular: Negative for chest pain  Musculoskeletal: Negative for myalgias  Skin: Negative for rash  Neurological: Negative for headaches  Physical Exam  Physical Exam   Constitutional: She appears well-developed and well-nourished  No distress  HENT:   Head: Normocephalic and atraumatic  Right Ear: External ear normal    Left Ear: External ear normal    Mouth/Throat: Oropharynx is clear and moist  No oropharyngeal exudate  Neck: Normal range of motion  Cardiovascular: Normal rate and normal heart sounds  Pulmonary/Chest: She has wheezes  She has rhonchi in the right lower field and the left lower field  Musculoskeletal: Normal range of motion  Neurological: She is alert  Skin: Skin is warm  She is not diaphoretic  Vitals reviewed        Vital Signs  ED Triage Vitals   Temperature Pulse Respirations Blood Pressure SpO2   08/13/18 0916 08/13/18 0915 08/13/18 0915 08/13/18 0915 08/13/18 0915   98 5 °F (36 9 °C) 90 16 135/84 93 %      Temp Source Heart Rate Source Patient Position - Orthostatic VS BP Location FiO2 (%)   08/13/18 0916 08/13/18 1152 -- 08/13/18 1152 --   Oral Monitor  Right arm       Pain Score       08/13/18 0915       No Pain           Vitals:    08/13/18 0915 08/13/18 1152   BP: 135/84 120/60   Pulse: 90 79       Visual Acuity      ED Medications  Medications   dexamethasone (PF) (DECADRON) injection 10 mg (10 mg Intravenous Given 8/13/18 0943)   albuterol inhalation solution 10 mg (10 mg Nebulization Given 8/13/18 0949)   ipratropium (ATROVENT) 0 02 % inhalation solution 1 mg (1 mg Nebulization Given 8/13/18 0950)   sodium chloride 0 9 % inhalation solution 3 mL (3 mL Nebulization Given 8/13/18 0952)   albuterol inhalation solution 5 mg (5 mg Nebulization Given 8/13/18 1245)   ipratropium (ATROVENT) 0 02 % inhalation solution 0 5 mg (0 5 mg Nebulization Given 8/13/18 1245)       Diagnostic Studies  Results Reviewed     Procedure Component Value Units Date/Time    POCT pregnancy, urine [[id number]]  (Normal) Resulted:  08/13/18 1235    Lab Status:  Final result Updated:  08/13/18 1235     EXT PREG TEST UR (Ref: Negative) --    Basic metabolic panel [[id number]]  (Abnormal) Collected:  08/13/18 1111    Lab Status:  Final result Specimen:  Blood from Hand, Right Updated:  08/13/18 1146     Sodium 139 mmol/L      Potassium 3 5 mmol/L      Chloride 107 mmol/L      CO2 25 mmol/L      Anion Gap 7 mmol/L      BUN 18 mg/dL      Creatinine 0 80 mg/dL      Glucose 155 (H) mg/dL      Calcium 8 7 mg/dL      eGFR 94 ml/min/1 73sq m     Narrative:         National Kidney Disease Education Program recommendations are as follows:  GFR calculation is accurate only with a steady state creatinine  Chronic Kidney disease less than 60 ml/min/1 73 sq  meters  Kidney failure less than 15 ml/min/1 73 sq  meters      CBC and differential [[id number]] Collected:  08/13/18 1112    Lab Status:  Final result Specimen:  Blood from Hand, Right Updated:  08/13/18 1124     WBC 9 67 Thousand/uL      RBC 4 90 Million/uL      Hemoglobin 14 5 g/dL      Hematocrit 43 7 %      MCV 89 fL      MCH 29 6 pg MCHC 33 2 g/dL      RDW 14 3 %      MPV 11 1 fL      Platelets 403 Thousands/uL      nRBC 0 /100 WBCs      Neutrophils Relative 70 %      Immat GRANS % 0 %      Lymphocytes Relative 23 %      Monocytes Relative 5 %      Eosinophils Relative 1 %      Basophils Relative 1 %      Neutrophils Absolute 6 75 Thousands/µL      Immature Grans Absolute 0 03 Thousand/uL      Lymphocytes Absolute 2 25 Thousands/µL      Monocytes Absolute 0 44 Thousand/µL      Eosinophils Absolute 0 13 Thousand/µL      Basophils Absolute 0 07 Thousands/µL                  CT chest without contrast   Final Result by Daja Tsang MD (08/13 1319)      No evidence of pneumonia  Nonobstructing left renal calculus  Workstation performed: [id number]         XR chest 2 views   Final Result by Myriam Jensen MD (08/13 1106)      No acute cardiopulmonary disease  Workstation performed: [id number]                    Procedures  Procedures       Phone Contacts  ED Phone Contact    ED Course                               MDM  Number of Diagnoses or Management Options  Asthma exacerbation, non-allergic, unspecified asthma severity: new and requires workup  URI, acute: new and requires workup  Diagnosis management comments: 44 yo female presents for evaluation of cough x 1 week  Reports having worsening asthma symptoms despite using albuterol inhaler  Pt has inspiratory and expiratory which has not improved despite ROSAS nebulizer and duoneb  Continues to have wheezing and rhonchi on expiration  Will admit for obs at this time          Amount and/or Complexity of Data Reviewed  Tests in the radiology section of CPT®: ordered and reviewed      CritCare Time    Disposition  Final diagnoses:   Asthma exacerbation, non-allergic, unspecified asthma severity   URI, acute     Time reflects when diagnosis was documented in both MDM as applicable and the Disposition within this note     Time User Action Codes Description Comment 8/13/2018  1:52 PM Clara Morocho Add [J45 901] Asthma exacerbation, non-allergic, unspecified asthma severity     8/13/2018  1:53 PM Claar Morocho Add [J06 9] URI, acute     8/13/2018  3:30 PM Carlos BARTLETT Add [J45 901] Asthma with acute exacerbation     8/13/2018  3:30 PM Carlos Beckford Modify [J45 901] Asthma with acute exacerbation       ED Disposition     ED Disposition Condition Comment    Admit  Case was discussed with Dr Baljeet Laird and the patient's admission status was agreed to be Admission Status: observation status to the service of Dr Baljeet Laird   Follow-up Information    None         Patient's Medications   Discharge Prescriptions    No medications on file     No discharge procedures on file      ED Provider  Electronically Signed by           Simon Rangel PA-C  08/13/18 6921

## 2018-08-13 NOTE — ED NOTES
Post peak flow 270, still having a raspy congested wheezy non productive cough     Bettina Duarte RN  08/13/18 6291

## 2018-08-13 NOTE — ASSESSMENT & PLAN NOTE
Likely asthma exacerbation precipitated by a viral infection will provide her with IV Solu-Medrol, respiratory treatments, supportive care  Check respiratory pathogen PCR  Request Pulmonary inputs

## 2018-08-14 PROBLEM — D72.829 LEUKOCYTOSIS: Status: ACTIVE | Noted: 2018-08-14

## 2018-08-14 PROBLEM — J45.21 MILD INTERMITTENT ASTHMA WITH ACUTE EXACERBATION: Status: ACTIVE | Noted: 2018-08-13

## 2018-08-14 LAB
ANION GAP SERPL CALCULATED.3IONS-SCNC: 7 MMOL/L (ref 4–13)
BASOPHILS # BLD AUTO: 0.02 THOUSANDS/ΜL (ref 0–0.1)
BASOPHILS NFR BLD AUTO: 0 % (ref 0–1)
BUN SERPL-MCNC: 16 MG/DL (ref 5–25)
C DIFF TOX GENS STL QL NAA+PROBE: NORMAL
CALCIUM SERPL-MCNC: 8.8 MG/DL (ref 8.3–10.1)
CHLORIDE SERPL-SCNC: 104 MMOL/L (ref 100–108)
CO2 SERPL-SCNC: 25 MMOL/L (ref 21–32)
CREAT SERPL-MCNC: 0.73 MG/DL (ref 0.6–1.3)
EOSINOPHIL # BLD AUTO: 0 THOUSAND/ΜL (ref 0–0.61)
EOSINOPHIL NFR BLD AUTO: 0 % (ref 0–6)
ERYTHROCYTE [DISTWIDTH] IN BLOOD BY AUTOMATED COUNT: 14.3 % (ref 11.6–15.1)
EST. AVERAGE GLUCOSE BLD GHB EST-MCNC: 114 MG/DL
GFR SERPL CREATININE-BSD FRML MDRD: 105 ML/MIN/1.73SQ M
GLUCOSE SERPL-MCNC: 148 MG/DL (ref 65–140)
GLUCOSE SERPL-MCNC: 152 MG/DL (ref 65–140)
GLUCOSE SERPL-MCNC: 158 MG/DL (ref 65–140)
GLUCOSE SERPL-MCNC: 184 MG/DL (ref 65–140)
GLUCOSE SERPL-MCNC: 201 MG/DL (ref 65–140)
HBA1C MFR BLD: 5.6 % (ref 4.2–6.3)
HCT VFR BLD AUTO: 42.6 % (ref 34.8–46.1)
HGB BLD-MCNC: 14 G/DL (ref 11.5–15.4)
IMM GRANULOCYTES # BLD AUTO: 0.1 THOUSAND/UL (ref 0–0.2)
IMM GRANULOCYTES NFR BLD AUTO: 1 % (ref 0–2)
LYMPHOCYTES # BLD AUTO: 2.15 THOUSANDS/ΜL (ref 0.6–4.47)
LYMPHOCYTES NFR BLD AUTO: 14 % (ref 14–44)
MCH RBC QN AUTO: 29.4 PG (ref 26.8–34.3)
MCHC RBC AUTO-ENTMCNC: 32.9 G/DL (ref 31.4–37.4)
MCV RBC AUTO: 89 FL (ref 82–98)
MONOCYTES # BLD AUTO: 0.5 THOUSAND/ΜL (ref 0.17–1.22)
MONOCYTES NFR BLD AUTO: 3 % (ref 4–12)
NEUTROPHILS # BLD AUTO: 12.94 THOUSANDS/ΜL (ref 1.85–7.62)
NEUTS SEG NFR BLD AUTO: 82 % (ref 43–75)
NRBC BLD AUTO-RTO: 0 /100 WBCS
PLATELET # BLD AUTO: 282 THOUSANDS/UL (ref 149–390)
PMV BLD AUTO: 11.5 FL (ref 8.9–12.7)
POTASSIUM SERPL-SCNC: 3.6 MMOL/L (ref 3.5–5.3)
RBC # BLD AUTO: 4.77 MILLION/UL (ref 3.81–5.12)
SODIUM SERPL-SCNC: 136 MMOL/L (ref 136–145)
WBC # BLD AUTO: 15.71 THOUSAND/UL (ref 4.31–10.16)

## 2018-08-14 PROCEDURE — 94640 AIRWAY INHALATION TREATMENT: CPT

## 2018-08-14 PROCEDURE — 99254 IP/OBS CNSLTJ NEW/EST MOD 60: CPT | Performed by: INTERNAL MEDICINE

## 2018-08-14 PROCEDURE — 94760 N-INVAS EAR/PLS OXIMETRY 1: CPT

## 2018-08-14 PROCEDURE — 82948 REAGENT STRIP/BLOOD GLUCOSE: CPT

## 2018-08-14 PROCEDURE — 85025 COMPLETE CBC W/AUTO DIFF WBC: CPT | Performed by: INTERNAL MEDICINE

## 2018-08-14 PROCEDURE — 83036 HEMOGLOBIN GLYCOSYLATED A1C: CPT | Performed by: INTERNAL MEDICINE

## 2018-08-14 PROCEDURE — 99232 SBSQ HOSP IP/OBS MODERATE 35: CPT | Performed by: PHYSICIAN ASSISTANT

## 2018-08-14 PROCEDURE — 80048 BASIC METABOLIC PNL TOTAL CA: CPT | Performed by: INTERNAL MEDICINE

## 2018-08-14 RX ORDER — BUDESONIDE AND FORMOTEROL FUMARATE DIHYDRATE 160; 4.5 UG/1; UG/1
2 AEROSOL RESPIRATORY (INHALATION) 2 TIMES DAILY
Status: DISCONTINUED | OUTPATIENT
Start: 2018-08-14 | End: 2018-08-16 | Stop reason: HOSPADM

## 2018-08-14 RX ORDER — METHYLPREDNISOLONE SODIUM SUCCINATE 40 MG/ML
40 INJECTION, POWDER, LYOPHILIZED, FOR SOLUTION INTRAMUSCULAR; INTRAVENOUS EVERY 12 HOURS SCHEDULED
Status: DISCONTINUED | OUTPATIENT
Start: 2018-08-14 | End: 2018-08-15

## 2018-08-14 RX ADMIN — GUAIFENESIN 600 MG: 600 TABLET, EXTENDED RELEASE ORAL at 10:14

## 2018-08-14 RX ADMIN — IPRATROPIUM BROMIDE 0.5 MG: 0.5 SOLUTION RESPIRATORY (INHALATION) at 13:41

## 2018-08-14 RX ADMIN — LEVALBUTEROL HYDROCHLORIDE 1.25 MG: 1.25 SOLUTION, CONCENTRATE RESPIRATORY (INHALATION) at 07:16

## 2018-08-14 RX ADMIN — LEVALBUTEROL HYDROCHLORIDE 1.25 MG: 1.25 SOLUTION, CONCENTRATE RESPIRATORY (INHALATION) at 20:26

## 2018-08-14 RX ADMIN — BUDESONIDE AND FORMOTEROL FUMARATE DIHYDRATE 2 PUFF: 160; 4.5 AEROSOL RESPIRATORY (INHALATION) at 14:41

## 2018-08-14 RX ADMIN — METHYLPREDNISOLONE SODIUM SUCCINATE 40 MG: 40 INJECTION, POWDER, FOR SOLUTION INTRAMUSCULAR; INTRAVENOUS at 05:18

## 2018-08-14 RX ADMIN — IPRATROPIUM BROMIDE 0.5 MG: 0.5 SOLUTION RESPIRATORY (INHALATION) at 07:16

## 2018-08-14 RX ADMIN — IPRATROPIUM BROMIDE 0.5 MG: 0.5 SOLUTION RESPIRATORY (INHALATION) at 20:26

## 2018-08-14 RX ADMIN — METHYLPREDNISOLONE SODIUM SUCCINATE 40 MG: 40 INJECTION, POWDER, FOR SOLUTION INTRAMUSCULAR; INTRAVENOUS at 20:47

## 2018-08-14 RX ADMIN — ENOXAPARIN SODIUM 40 MG: 40 INJECTION SUBCUTANEOUS at 10:25

## 2018-08-14 RX ADMIN — GUAIFENESIN 600 MG: 600 TABLET, EXTENDED RELEASE ORAL at 20:47

## 2018-08-14 RX ADMIN — ACETAMINOPHEN 650 MG: 325 TABLET, FILM COATED ORAL at 14:45

## 2018-08-14 RX ADMIN — IPRATROPIUM BROMIDE 0.5 MG: 0.5 SOLUTION RESPIRATORY (INHALATION) at 00:17

## 2018-08-14 RX ADMIN — LEVALBUTEROL HYDROCHLORIDE 1.25 MG: 1.25 SOLUTION, CONCENTRATE RESPIRATORY (INHALATION) at 13:41

## 2018-08-14 RX ADMIN — LEVALBUTEROL HYDROCHLORIDE 1.25 MG: 1.25 SOLUTION, CONCENTRATE RESPIRATORY (INHALATION) at 00:17

## 2018-08-14 NOTE — CASE MANAGEMENT
Initial Clinical Review     admit OBS on  8/13 @  1355  Changed to  INPT  On   8/14 @  1153    1153  Due to need for ongoing IV steroids, duo nebs q 6 hrs     I certify that inpatient services are medically necessary for this patient for a duration of greater than two midnights    ED: Date/Time/Mode of Arrival:   ED Arrival Information     Expected Arrival Acuity Means of Arrival Escorted By Service Admission Type    - 8/13/2018 08:54 Less Urgent Walk-In Self General Medicine Urgent    Arrival Complaint    CONGESTION, COUGH          Chief Complaint:   Chief Complaint   Patient presents with    Cough     Progressive cough since Last week  Patient recently lost voice  Fever last week  History of Illness:   29-year-old female with history of asthma presents for evaluation of ache,  cough for the past 1 week  Patient reports that 1 week ago she started losing her voice  States that 4 days ago she was placed on a Z-Kip and has not noticed any improvement  States that she was around her  who had similar symptoms for the past few weeks  She also states that she has congestion, productive cough, facial pressure, wheezing and shortness of breath with exertion and occasional vomiting  Admits to subjective fever  Patient states that she has been doing her nebulizer and albuterol inhaler every 4-6 hours last dose was 4 5 hours ago   She is also a smoker        112 kg (245 lb 13 oz)    08/13/18 1759  98 4 °F (36 9 °C)  87  --  159/96  97 %  None (Room air)   08/13/18 1152  --  79  16  120/60  97 %  --   08/13/18 0916  98 5 °F (36 9 °C)  --  --  --  --  --   08/13/18 0915  --  90  16  135/84  93 %  None (Room air)     Post peak flow 270    Abnormal Labs/Diagnostic Test Results:   Wbc  9 67   15 71      ED Treatment:   Medication Administration from 08/13/2018 0854 to 08/13/2018 1750       Date/Time Order Dose Route Action Action by Comments     08/13/2018 0943 dexamethasone (PF) (DECADRON) injection 10 mg 10 mg Intravenous Given Conrad Melendrez RN      08/13/2018 6089 albuterol inhalation solution 10 mg 10 mg Nebulization Given Conrad Melendrez RN      08/13/2018 0950 ipratropium (ATROVENT) 0 02 % inhalation solution 1 mg 1 mg Nebulization Given Conrad Melendrez RN                 08/13/2018 1245 albuterol inhalation solution 5 mg 5 mg Nebulization Given Maria Esther Floyd RN      08/13/2018 1245 ipratropium (ATROVENT) 0 02 % inhalation solution 0 5 mg 0 5 mg Nebulization Given Maria Esther Floyd RN           Past Medical/Surgical History:    Active Ambulatory Problems     Diagnosis Date Noted    No Active Ambulatory Problems     Resolved Ambulatory Problems     Diagnosis Date Noted    No Resolved Ambulatory Problems     Past Medical History:   Diagnosis Date    Asthma     Kidney stones        Admitting Diagnosis: Asthma with acute exacerbation [J45 901]  URI, acute [J06 9]  Nasal congestion [R09 81]  Asthma exacerbation, non-allergic, unspecified asthma severity [J45 901]    Assessment/Plan:   * Asthma with acute exacerbation   Assessment & Plan     Likely asthma exacerbation precipitated by a viral infection will provide her with IV Solu-Medrol, respiratory treatments, supportive care  Check respiratory pathogen PCR  Request Pulmonary inputs          Cough   Assessment & Plan     With laryngitis likely due to 1 above will provide her with supportive care          Hyperglycemia   Assessment & Plan     Check A1c, monitor Accu-Cheks          Tobacco abuse   Assessment & Plan     Now on Chantix  Counseled          Obese   Assessment & Plan     Therapeutic lifestyle modification discussed                VTE Prophylaxis: Enoxaparin (Lovenox)  / sequential compression device      Admission Orders:  Admit OBS - medical  Consult pulmonology  accucks qid w/sliding scale insulin  Ambulate TID  Reg diet   Respiratory protocol -  Xopenex/atrovent  nebulizer therapy ordered Q6, with prn MDI albuterol  IV solumedrol 40 mg q 8    Scheduled Meds:   Current Facility-Administered Medications:  acetaminophen 650 mg Oral Q6H PRN Miguel Miranda PA-C   albuterol 2 puff Inhalation Q4H PRN Je Morris MD   aluminum-magnesium hydroxide-simethicone 5 mL Oral Q6H PRN Je Morris MD   benzonatate 100 mg Oral TID PRN Je Morris MD   docusate sodium 100 mg Oral BID Je Morris MD   enoxaparin 40 mg Subcutaneous Daily Je Morris MD   guaiFENesin 600 mg Oral Q12H [facility] Je Morris MD   ipratropium 0 5 mg Nebulization Q6H Je Morris MD   levalbuterol 1 25 mg Nebulization Q8H PRN Je Morris MD   levalbuterol 1 25 mg Nebulization Q6H Je Morris MD   methylPREDNISolone sodium succinate 40 mg Intravenous Q8H [facility] Je Morris MD   nicotine 1 patch Transdermal Daily Je Morris MD   ondansetron 4 mg Intravenous Q6H PRN Je Morris MD   traMADol 50 mg Oral Q6H PRN Miguel Miranda PA-C       8/14/2018  97 5  76   18   125/70   97% ra     PULMONARY  1  Mild intermittent asthma with acute exacerbation secondary to probable viral URI  I am unsure if the patient has any underlying COPD based off her extensive smoking history as she has not had PFTs  She states that she is asymptomatic between bouts of upper respiratory tract illnesses  · D/C respiratory pathogen PCR  · Xopenex/Atrovent q 6 hours  · Decrease Solu-Medrol to 40 mg IV q 12 hours  · Start Symbicort 160/4 52 puffs b i d  -will likely bedischarged home on daily inhaler in till outpatient pulmonary follow-up/workup complete   · Outpatient Pulmonary follow-up/office spirometry/PFTs     2   Tobacco abuse -was taking Chantix at home  · Nicotine patch while in the hospital   · Strongly encouraged that she stop smoking completely     Hospitalist  Leukocytosis likely steroid induced  Hyperglycemia - suspect 2/2 IV steroids - cont sliding scale coverage

## 2018-08-14 NOTE — PROGRESS NOTES
Progress Note - Rishabh Basilio 1980, 45 y o  female MRN: [id number]    Unit/Bed#: [facility] 610-01 Encounter: [id number]    Primary Care Provider: Carson Ivory MD   Date and time admitted to hospital: 8/13/2018  9:18 AM        * Mild intermittent asthma with acute exacerbation   Assessment & Plan    · Suspect secondary to viral URI  · Pulmonology following - I discussed with Dr Justen Easley today  · Per pulm recs, the patient's IV steroids were decreased to q12hr  Continue Xopenex/Atrovent nebs  Add Symbicort 160 two puffs BID  · Will need PFTs as outpatient  · Follow up with pulm        Leukocytosis   Assessment & Plan    · Likely steroid-induced        Hyperglycemia   Assessment & Plan    · HbA1c 5 6  · Denies history of diabetes   · Suspect worsening hyperglycemia 2/2 IV steroids  She is on SSI coverage and IV steroid frequency has been decreased        Cough   Assessment & Plan    · Suspect viral URI  · Supportive care        Tobacco abuse   Assessment & Plan    · On Chantix as outpatient  She is not interested in the nicotine patch  · Cessation was encouraged        Obese   Assessment & Plan    · Discussed diet, exercise, and weight loss  Patient reports that she is actively working towards this as outpatient          VTE Pharmacologic Prophylaxis:   Pharmacologic: Enoxaparin (Lovenox)  Mechanical VTE Prophylaxis in Place: Yes    Patient Centered Rounds: I have performed bedside rounds with nursing staff today  Massiel    Discussions with Specialists or Other Care Team Provider: Dr Justen Easley, [address] [facility],     Education and Discussions with Family / Patient: patient; when asked if there was anyone she would like me to call today with an update, the patient kindly declined    Time Spent for Care: 30 minutes  More than 50% of total time spent on counseling and coordination of care as described above      Current Length of Stay: 0 day(s)    Current Patient Status: Observation   Certification Statement: The patient, admitted on an observation basis, will now require > 2 midnight hospital stay due to continued IV steroids, scheduled nebs    Discharge Plan: anticipate will need at least another 24 hours IV steroids and nebs     Code Status: Level 1 - Full Code      Subjective:   Ms Mckenna Duong states that she is feeling better today than yesterday, but not yet at baseline  She still feels tight in her chest      Objective:     Vitals:   Temp (24hrs), Av 9 °F (36 6 °C), Min:97 5 °F (36 4 °C), Max:98 4 °F (36 9 °C)    HR:  [76-96] 96  Resp:  [16-18] 16  BP: (120-159)/(60-96) 129/74  SpO2:  [96 %-99 %] 99 %  Body mass index is 47 22 kg/m²  Input and Output Summary (last 24 hours): Intake/Output Summary (Last 24 hours) at 18 1149  Last data filed at 18 1001   Gross per 24 hour   Intake             1560 ml   Output              600 ml   Net              960 ml       Physical Exam:     Physical Exam   Constitutional: She is oriented to person, place, and time  No distress  Patient seen sitting upright in bed with nurse present  She is very pleasant and cooperative  She has a hoarse voice - per her nurse, this is improved from last night   Cardiovascular: Normal rate and regular rhythm  Pulmonary/Chest: Effort normal  No respiratory distress  On room air and satting well  Rhonchus breath sounds bilaterally   Abdominal: Soft  Bowel sounds are normal  There is no tenderness  Musculoskeletal: She exhibits no edema  Neurological: She is alert and oriented to person, place, and time  Skin: Skin is warm  She is not diaphoretic  Psychiatric: She has a normal mood and affect  Vitals reviewed        Additional Data:     Labs:      Results from last 7 days  Lab Units 18  0556   WBC Thousand/uL 15 71*   HEMOGLOBIN g/dL 14 0   HEMATOCRIT % 42 6   PLATELETS Thousands/uL 282   NEUTROS PCT % 82*   LYMPHS PCT % 14   MONOS PCT % 3*   EOS PCT % 0       Results from last 7 days  Lab Units 18  0556 SODIUM mmol/L 136   POTASSIUM mmol/L 3 6   CHLORIDE mmol/L 104   CO2 mmol/L 25   BUN mg/dL 16   CREATININE mg/dL 0 73   CALCIUM mg/dL 8 8   GLUCOSE RANDOM mg/dL 158*           Results from last 7 days  Lab Units 08/14/18  0816 08/13/18  2055   POC GLUCOSE mg/dl 184* 184*       Results from last 7 days  Lab Units 08/14/18  0556   HEMOGLOBIN A1C % 5 6         * I Have Reviewed All Lab Data Listed Above  * Additional Pertinent Lab Tests Reviewed: All Labs Within Last 24 Hours Reviewed    Imaging:    Imaging Reports Reviewed Today Include: CT chest, CXR  Imaging Personally Reviewed by Myself Includes:  None    Recent Cultures (last 7 days):       Results from last 7 days  Lab Units 08/13/18 2046   C DIFF TOXIN B  NEGATIVE for C difficle toxin by PCR          Last 24 Hours Medication List:     Current Facility-Administered Medications:  acetaminophen 650 mg Oral Q6H PRN Francis Tatum PA-C   albuterol 2 puff Inhalation Q4H PRN Erin Brower MD   aluminum-magnesium hydroxide-simethicone 5 mL Oral Q6H PRN Erin Brower MD   benzonatate 100 mg Oral TID PRN Erin Brower MD   budesonide-formoterol 2 puff Inhalation BID Clau Nina PA-C   docusate sodium 100 mg Oral BID Erin Brower MD   enoxaparin 40 mg Subcutaneous Daily Erin Brower MD   guaiFENesin 600 mg Oral Q12H [address] Erin Brower MD   ipratropium 0 5 mg Nebulization Q6H Erin Brower MD   levalbuterol 1 25 mg Nebulization Q8H PRN Erin Brower MD   levalbuterol 1 25 mg Nebulization Q6H Erin Brower MD   methylPREDNISolone sodium succinate 40 mg Intravenous Q12H [address] Clau Nina PA-C   nicotine 1 patch Transdermal Daily Erin Brower MD   ondansetron 4 mg Intravenous Q6H PRN Erin Brower MD   traMADol 50 mg Oral Q6H PRN Francis Tatum PA-C        Today, Patient Was Seen By: Clau Nina PA-C    ** Please Note: Dictation voice to text software may have been used in the creation of this document   **

## 2018-08-14 NOTE — RESPIRATORY THERAPY NOTE
RT Protocol Note  Janet Snyder 45 y o  female MRN: [id number]  Unit/Bed#: [facility] 610-01 Encounter: [id number]    Assessment    Principal Problem:    Asthma with acute exacerbation  Active Problems:    Cough    Obese    Tobacco abuse    Hyperglycemia      Home Pulmonary Medications:  reviewed       Past Medical History:   Diagnosis Date    Asthma     Kidney stones      Social History     Social History    Marital status: /Civil Union     Spouse name: N/A    Number of children: N/A    Years of education: N/A     Social History Main Topics    Smoking status: Current Every Day Smoker     Packs/day: 0 50     Years: 29 00    Smokeless tobacco: Current User    Alcohol use No    Drug use: No    Sexual activity: Not Asked     Other Topics Concern    None     Social History Narrative    None       Subjective         Objective    Physical Exam:   Assessment Type: Assess only  General Appearance: Alert, Awake  Respiratory Pattern: Normal  Chest Assessment: Chest expansion symmetrical  Bilateral Breath Sounds: Coarse, Expiratory wheezes  R Breath Sounds: Coarse, Expiratory wheezes  L Breath Sounds: Coarse, Expiratory wheezes  Cough: Congested, Strong  O2 Device: room air    Vitals:  Blood pressure 159/96, pulse 87, temperature 98 4 °F (36 9 °C), resp  rate 16, height 5' 0 5" (1 537 m), weight 112 kg (245 lb 13 oz), SpO2 99 %  Imaging and other studies: I have personally reviewed pertinent reports  O2 Device: room air     Plan    Respiratory Plan: Moderate/Severe Distress pathway, Home Bronchodilator Patient pathway        Resp Comments: Pt assessed per respiratory protocol  Pt currently having slight difficulty breathing, pattern regular/appears unlabored  History of asthma, in which she utilizes prn nebulizers and MDI  Current smoker noted  Based on the physical assessment and medical history nebulizer therapy ordered Q6, with prn MDI   Will continue to monitor and assess per respiratory protocol

## 2018-08-14 NOTE — ASSESSMENT & PLAN NOTE
· HbA1c 5 6  · Denies history of diabetes   · Suspect worsening hyperglycemia 2/2 IV steroids   She is on SSI coverage and IV steroid frequency has been decreased

## 2018-08-14 NOTE — ASSESSMENT & PLAN NOTE
· On Chantix as outpatient   She is not interested in the nicotine patch  · Cessation was encouraged

## 2018-08-14 NOTE — CONSULTS
Pulmonary Consultation   Sofy Bauer 45 y o  female MRN: [id number]  Unit/Bed#: [facility] 610-01 Encounter: [id number]      Reason for consultation:   Asthma with acute exacerbationReason for consultation:    Requesting physician: Dr Frida Shetty    Impressions/Plans:    1  Mild intermittent asthma with acute exacerbation secondary to probable viral URI  I am unsure if the patient has any underlying COPD based off her extensive smoking history as she has not had PFTs  She states that she is asymptomatic between bouts of upper respiratory tract illnesses  · D/C respiratory pathogen PCR  · Xopenex/Atrovent q 6 hours  · Decrease Solu-Medrol to 40 mg IV q 12 hours  · Start Symbicort 160/4 52 puffs b i d  -will likely bedischarged home on daily inhaler in till outpatient pulmonary follow-up/workup complete   · Outpatient Pulmonary follow-up/office spirometry/PFTs    2  Tobacco abuse -was taking Chantix at home  · Nicotine patch while in the hospital  · Strongly encouraged that she stop smoking completely    History of Present Illness   HPI:  Sofy Bauer is a 45 y o  female who presents to the hospital after 1 week of shortness of breath and cough  She states 1 week ago she woke up with a tickle in her voice and the following day woke up with no voice  She felt that this was just a simple URI as her  had cough/mucus production  5 days ago she states that she woke up and had significant dyspnea on exertion as well as chest tightness and cough productive of yellow mucus  She used her mom's albuterol nebulizer which seem to help but did not last long  She had minimal fever and chills  That day at work she actually vomited during a coughing episode  Over this past weekend she states that she slept a lot again with ongoing dyspnea, cough, wheezing and chest tightness  She remained on the albuterol nebulizer throughout the week    Yesterday when she woke up her symptoms had worsened again at which time she presented to the emergency room  She was given nebulizer treatments without any significant improvement in her symptoms at which time she was admitted  She tells me that she was diagnosed with asthma in grade school  She has never used a chronic daily inhaler  She only uses albuterol as needed when she is sick  Unfortunately she has been a smoker for almost 30 years  At 1 point she was smoking a pack a day but most recently was down to half a pack a day  She stop smoking 4 days ago because of this acute illness  She was also taking Chantix at home as a plan to stop smoking  In addition to above symptoms she has reported some headache, some shaking after albuterol use, will episodes of diarrhea yesterday with nausea that have appear to resolve at this time  Review of systems:  12 point review of systems was completed and was otherwise negative except as listed in HPI        Historical Information   Past Medical History:   Diagnosis Date    Asthma     Kidney stones      Past Surgical History:   Procedure Laterality Date    CHOLECYSTECTOMY      TUBAL LIGATION       Family History   Problem Relation Age of Onset    Diabetes Mother     COPD Mother     Diabetes Father     Heart disease Father     Heart failure Father        Occupational History:  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    Social History:  History   Smoking Status    Current Every Day Smoker    Packs/day: 1 00    Years: 29 00    Types: Cigarettes   Smokeless Tobacco    Current User     Comment: No cigarettes since 08/10/2018, was down to half a pack a day     History   Alcohol Use No     History   Drug Use No     Marital Status: /Civil Union      Meds/Allergies   Current Facility-Administered Medications   Medication Dose Route Frequency    acetaminophen (TYLENOL) tablet 650 mg  650 mg Oral Q6H PRN    albuterol (PROVENTIL HFA,VENTOLIN HFA) inhaler 2 puff  2 puff Inhalation Q4H PRN    aluminum-magnesium hydroxide-simethicone (MYLANTA) 200-200-20 mg/5 mL oral suspension 5 mL  5 mL Oral Q6H PRN    benzonatate (TESSALON PERLES) capsule 100 mg  100 mg Oral TID PRN    docusate sodium (COLACE) capsule 100 mg  100 mg Oral BID    enoxaparin (LOVENOX) subcutaneous injection 40 mg  40 mg Subcutaneous Daily    guaiFENesin (MUCINEX) 12 hr tablet 600 mg  600 mg Oral Q12H JS    ipratropium (ATROVENT) 0 02 % inhalation solution 0 5 mg  0 5 mg Nebulization Q6H    levalbuterol (XOPENEX) inhalation solution 1 25 mg  1 25 mg Nebulization Q8H PRN    levalbuterol (XOPENEX) inhalation solution 1 25 mg  1 25 mg Nebulization Q6H    methylPREDNISolone sodium succinate (Solu-MEDROL) injection 40 mg  40 mg Intravenous Q8H [address]    nicotine (NICODERM CQ) 14 mg/24hr TD 24 hr patch 1 patch  1 patch Transdermal Daily    ondansetron (ZOFRAN) injection 4 mg  4 mg Intravenous Q6H PRN    traMADol (ULTRAM) tablet 50 mg  50 mg Oral Q6H PRN     Prescriptions Prior to Admission   Medication    albuterol (PROVENTIL HFA,VENTOLIN HFA) 90 mcg/act inhaler    azithromycin (ZITHROMAX) 250 mg tablet    varenicline (CHANTIX SARAH) 0 5 MG X 11 & 1 MG X 42 tablet     Allergies   Allergen Reactions    Erythromycin Hives    Other      Bee sting       Vitals: Blood pressure 129/74, pulse 96, temperature 97 7 °F (36 5 °C), resp  rate 16, height 5' 0 5" (1 537 m), weight 112 kg (245 lb 13 oz), SpO2 99 % , room air, Body mass index is 47 22 kg/m²        Intake/Output Summary (Last 24 hours) at 08/14/18 1121  Last data filed at 08/14/18 1001   Gross per 24 hour   Intake             1560 ml   Output              600 ml   Net              960 ml       Physical exam:    General Appearance:    Alert, cooperative, mild conversational dyspnea without accessory     muscle use       Head/eyes:    Normocephalic, without obvious abnormality, atraumatic,         PERRL, sclera non-injected, nonicteric    Nose:   Nares normal, mucosa normal, no drainage or sinus tenderness   Mouth:   Moist mucous membranes, no thrush, normal dentition   Neck:   Supple, trachea midline, no adenopathy; JVD not elevated   Lungs:   Decreased breath sounds in all lung fields with diffuse expiratory wheeze in all lung fields, no rales or rhonchi appreciated   Chest Wall:    No tenderness or deformity    Heart:    Regular rate and rhythm, S1 and S2 normal, no murmur, rub   or gallop   Abdomen:     Soft, non-tender, bowel sounds active all four quadrants,     no masses, no organomegaly   Extremities:   Extremities normal, atraumatic, no cyanosis or edema   Skin:   Warm, dry, turgor normal, no rashes or lesions   Lymph nodes:   No Cervical or supraclavicular lymphadenopathy   Neurologic:   CNII-XII grossly intact, normal strength, non-focal         Labs:   CBC:   Lab Results   Component Value Date    WBC 15 71 (H) 08/14/2018    HGB 14 0 08/14/2018    HCT 42 6 08/14/2018    MCV 89 08/14/2018     08/14/2018    MCH 29 4 08/14/2018    MCHC 32 9 08/14/2018    RDW 14 3 08/14/2018    MPV 11 5 08/14/2018    NRBC 0 08/14/2018   , CMP:   Lab Results   Component Value Date     08/14/2018    K 3 6 08/14/2018     08/14/2018    CO2 25 08/14/2018    ANIONGAP 7 08/14/2018    BUN 16 08/14/2018    CREATININE 0 73 08/14/2018    GLUCOSE 158 (H) 08/14/2018    CALCIUM 8 8 08/14/2018    EGFR 105 08/14/2018       Imaging and other studies: I have personally reviewed pertinent films in PACS  CT chest 08/13/2018:  No acute pulmonary infiltrations    Pulmonary function testing:     Never performedPulmonary function testing:    Code Status: Level 1 - Full Code    Claudis Cooks, DO      Portions of the record may have been created with voice recognition software  Occasional wrong word or "sound a like" substitutions may have occurred due to the inherent limitations of voice recognition software  Read the chart carefully and recognize, using context, where substitutions have occurred

## 2018-08-14 NOTE — PLAN OF CARE
Problem: PAIN - ADULT  Goal: Verbalizes/displays adequate comfort level or baseline comfort level  Interventions:  - Encourage patient to monitor pain and request assistance  - Assess pain using appropriate pain scale  - Administer analgesics based on type and severity of pain and evaluate response  - Implement non-pharmacological measures as appropriate and evaluate response  - Consider cultural and social influences on pain and pain management  - Notify physician/advanced practitioner if interventions unsuccessful or patient reports new pain  Outcome: Progressing      Problem: INFECTION - ADULT  Goal: Absence or prevention of progression during hospitalization  INTERVENTIONS:  - Assess and monitor for signs and symptoms of infection  - Monitor lab/diagnostic results  - Monitor all insertion sites, i e  indwelling lines, tubes, and drains  - Monitor endotracheal (as able) and nasal secretions for changes in amount and color  - [place] appropriate cooling/warming therapies per order  - Administer medications as ordered  - Instruct and encourage patient and family to use good hand hygiene technique  - Identify and instruct in appropriate isolation precautions for identified infection/condition  Outcome: Progressing      Problem: SAFETY ADULT  Goal: Patient will remain free of falls  INTERVENTIONS:  - Assess patient frequently for physical needs  -  Identify cognitive and physical deficits and behaviors that affect risk of falls    -  [place] fall precautions as indicated by assessment   - Educate patient/family on patient safety including physical limitations  - Instruct patient to call for assistance with activity based on assessment  - Modify environment to reduce risk of injury  - Consider OT/PT consult to assist with strengthening/mobility  Outcome: Progressing    Goal: Maintain or return to baseline ADL function  INTERVENTIONS:  -  Assess patient's ability to carry out ADLs; assess patient's baseline for ADL function and identify physical deficits which impact ability to perform ADLs (bathing, care of mouth/teeth, toileting, grooming, dressing, etc )  - Assess/evaluate cause of self-care deficits   - Assess range of motion  - Assess patient's mobility; develop plan if impaired  - Assess patient's need for assistive devices and provide as appropriate  - Encourage maximum independence but intervene and supervise when necessary  ¯ Involve family in performance of ADLs  ¯ Assess for home care needs following discharge   ¯ Request OT consult to assist with ADL evaluation and planning for discharge  ¯ Provide patient education as appropriate  Outcome: Progressing    Goal: Maintain or return mobility status to optimal level  INTERVENTIONS:  - Assess patient's baseline mobility status (ambulation, transfers, stairs, etc )    - Identify cognitive and physical deficits and behaviors that affect mobility  - Identify mobility aids required to assist with transfers and/or ambulation (gait belt, sit-to-stand, lift, walker, cane, etc )  - [place] fall precautions as indicated by assessment  - Record patient progress and toleration of activity level on Mobility SBAR; progress patient to next Phase/Stage  - Instruct patient to call for assistance with activity based on assessment  - Request Rehabilitation consult to assist with strengthening/weightbearing, etc   Outcome: Progressing      Problem: DISCHARGE PLANNING  Goal: Discharge to home or other facility with appropriate resources  INTERVENTIONS:  - Identify barriers to discharge w/patient and caregiver  - Arrange for needed discharge resources and transportation as appropriate  - Identify discharge learning needs (meds, wound care, etc )  - Arrange for interpretive services to assist at discharge as needed  - Refer to Case Management Department for coordinating discharge planning if the patient needs post-hospital services based on physician/advanced practitioner order or complex needs related to functional status, cognitive ability, or social support system  Outcome: Progressing      Problem: Knowledge Deficit  Goal: Patient/family/caregiver demonstrates understanding of disease process, treatment plan, medications, and discharge instructions  Complete learning assessment and assess knowledge base    Interventions:  - Provide teaching at level of understanding  - Provide teaching via preferred learning methods  Outcome: Progressing

## 2018-08-15 LAB
ERYTHROCYTE [DISTWIDTH] IN BLOOD BY AUTOMATED COUNT: 14.6 % (ref 11.6–15.1)
GLUCOSE SERPL-MCNC: 133 MG/DL (ref 65–140)
GLUCOSE SERPL-MCNC: 136 MG/DL (ref 65–140)
GLUCOSE SERPL-MCNC: 137 MG/DL (ref 65–140)
GLUCOSE SERPL-MCNC: 151 MG/DL (ref 65–140)
HCT VFR BLD AUTO: 42.5 % (ref 34.8–46.1)
HGB BLD-MCNC: 14.1 G/DL (ref 11.5–15.4)
MCH RBC QN AUTO: 29.8 PG (ref 26.8–34.3)
MCHC RBC AUTO-ENTMCNC: 33.2 G/DL (ref 31.4–37.4)
MCV RBC AUTO: 90 FL (ref 82–98)
PLATELET # BLD AUTO: 277 THOUSANDS/UL (ref 149–390)
PMV BLD AUTO: 11.2 FL (ref 8.9–12.7)
RBC # BLD AUTO: 4.73 MILLION/UL (ref 3.81–5.12)
WBC # BLD AUTO: 18.73 THOUSAND/UL (ref 4.31–10.16)

## 2018-08-15 PROCEDURE — 82948 REAGENT STRIP/BLOOD GLUCOSE: CPT

## 2018-08-15 PROCEDURE — 94760 N-INVAS EAR/PLS OXIMETRY 1: CPT

## 2018-08-15 PROCEDURE — 94668 MNPJ CHEST WALL SBSQ: CPT

## 2018-08-15 PROCEDURE — 99232 SBSQ HOSP IP/OBS MODERATE 35: CPT | Performed by: GENERAL PRACTICE

## 2018-08-15 PROCEDURE — 99232 SBSQ HOSP IP/OBS MODERATE 35: CPT | Performed by: INTERNAL MEDICINE

## 2018-08-15 PROCEDURE — 94640 AIRWAY INHALATION TREATMENT: CPT

## 2018-08-15 PROCEDURE — 85027 COMPLETE CBC AUTOMATED: CPT | Performed by: PHYSICIAN ASSISTANT

## 2018-08-15 RX ORDER — METHYLPREDNISOLONE SODIUM SUCCINATE 40 MG/ML
40 INJECTION, POWDER, LYOPHILIZED, FOR SOLUTION INTRAMUSCULAR; INTRAVENOUS ONCE
Status: COMPLETED | OUTPATIENT
Start: 2018-08-15 | End: 2018-08-15

## 2018-08-15 RX ORDER — BUDESONIDE AND FORMOTEROL FUMARATE DIHYDRATE 160; 4.5 UG/1; UG/1
2 AEROSOL RESPIRATORY (INHALATION) 2 TIMES DAILY
Qty: 1 INHALER | Refills: 0 | Status: SHIPPED | OUTPATIENT
Start: 2018-08-15 | End: 2018-08-15 | Stop reason: HOSPADM

## 2018-08-15 RX ORDER — FLUTICASONE FUROATE AND VILANTEROL 100; 25 UG/1; UG/1
1 POWDER RESPIRATORY (INHALATION) DAILY
Qty: 1 INHALER | Refills: 0 | Status: SHIPPED | OUTPATIENT
Start: 2018-08-15 | End: 2020-03-18 | Stop reason: CLARIF

## 2018-08-15 RX ORDER — PREDNISONE 20 MG/1
40 TABLET ORAL DAILY
Status: DISCONTINUED | OUTPATIENT
Start: 2018-08-16 | End: 2018-08-16 | Stop reason: HOSPADM

## 2018-08-15 RX ADMIN — GUAIFENESIN 600 MG: 600 TABLET, EXTENDED RELEASE ORAL at 08:50

## 2018-08-15 RX ADMIN — ENOXAPARIN SODIUM 40 MG: 40 INJECTION SUBCUTANEOUS at 08:51

## 2018-08-15 RX ADMIN — METHYLPREDNISOLONE SODIUM SUCCINATE 40 MG: 40 INJECTION, POWDER, FOR SOLUTION INTRAMUSCULAR; INTRAVENOUS at 08:51

## 2018-08-15 RX ADMIN — IPRATROPIUM BROMIDE 0.5 MG: 0.5 SOLUTION RESPIRATORY (INHALATION) at 13:54

## 2018-08-15 RX ADMIN — LEVALBUTEROL HYDROCHLORIDE 1.25 MG: 1.25 SOLUTION, CONCENTRATE RESPIRATORY (INHALATION) at 13:54

## 2018-08-15 RX ADMIN — LEVALBUTEROL HYDROCHLORIDE 1.25 MG: 1.25 SOLUTION, CONCENTRATE RESPIRATORY (INHALATION) at 09:15

## 2018-08-15 RX ADMIN — IPRATROPIUM BROMIDE 0.5 MG: 0.5 SOLUTION RESPIRATORY (INHALATION) at 00:32

## 2018-08-15 RX ADMIN — IPRATROPIUM BROMIDE 0.5 MG: 0.5 SOLUTION RESPIRATORY (INHALATION) at 09:15

## 2018-08-15 RX ADMIN — GUAIFENESIN 600 MG: 600 TABLET, EXTENDED RELEASE ORAL at 20:28

## 2018-08-15 RX ADMIN — IPRATROPIUM BROMIDE 0.5 MG: 0.5 SOLUTION RESPIRATORY (INHALATION) at 19:13

## 2018-08-15 RX ADMIN — DOCUSATE SODIUM 100 MG: 100 CAPSULE, LIQUID FILLED ORAL at 23:42

## 2018-08-15 RX ADMIN — BUDESONIDE AND FORMOTEROL FUMARATE DIHYDRATE 2 PUFF: 160; 4.5 AEROSOL RESPIRATORY (INHALATION) at 08:52

## 2018-08-15 RX ADMIN — LEVALBUTEROL HYDROCHLORIDE 1.25 MG: 1.25 SOLUTION, CONCENTRATE RESPIRATORY (INHALATION) at 00:32

## 2018-08-15 RX ADMIN — LEVALBUTEROL HYDROCHLORIDE 1.25 MG: 1.25 SOLUTION, CONCENTRATE RESPIRATORY (INHALATION) at 19:13

## 2018-08-15 RX ADMIN — BUDESONIDE AND FORMOTEROL FUMARATE DIHYDRATE 2 PUFF: 160; 4.5 AEROSOL RESPIRATORY (INHALATION) at 18:58

## 2018-08-15 RX ADMIN — METHYLPREDNISOLONE SODIUM SUCCINATE 40 MG: 40 INJECTION, POWDER, FOR SOLUTION INTRAMUSCULAR; INTRAVENOUS at 20:29

## 2018-08-15 RX ADMIN — BENZONATATE 100 MG: 100 CAPSULE ORAL at 08:50

## 2018-08-15 NOTE — ASSESSMENT & PLAN NOTE
· Discussed diet, exercise, and weight loss   Patient reports that she is actively working towards this as outpatient

## 2018-08-15 NOTE — SOCIAL WORK
[facility]  Dr Maryann Tran requesting cost of [employer] to be assessed  CM sent Breo script to [facility]  Per Guillermo, with provided coupon, pt can have med for $10 00/mo for a year

## 2018-08-15 NOTE — PROGRESS NOTES
Progress Note - Pulmonary   Melburn Shadow 45 y o  female MRN: [id number]  Unit/Bed#: [facility] 610-01 Encounter: [id number]      Assessment/Plan:  1  Mild intermittent asthma with acute exacerbation        *  Likely related to viral URI        *  Continue Xopenex/Atrovent nebs Q6hrs        *  Continue Symbicort and she should remain on this at D/C        *  Continue solumedrol at current dose and change to prednisone in AM --> taper by 10mg every 3 days  2  Tobacco abuse        *  Smoking cessation discussed        *  Continue nicotine patch --> was on Chantix at home    ~Incentive spirometry Q1hr, OOB as able, increase activity as able  ~Add flutter valve  ~ updated at bedside  ~Discussed with Dr Gavin Rosales --> D/C later today after steroids depending on exam vs D/C in AM  ~Outpatient pulmonary follow up as per D/C instructions    Subjective:  Mrs Wild Monge is seen sitting in bed  Her  is at her bedside  He has already been discharged from the hospital earlier this morning  She is very anxious to get out of here today  She continues to have bronchospasm and has not been out of bed much today  She tells me she was walking in the hallways yesterday  She notes overall improvement in her breathing and still has a productive cough  She denies chest pain, resting shortness of breath, or fever  Objective:     Vitals: Blood pressure 117/86, pulse 93, temperature 98 2 °F (36 8 °C), resp  rate 20, height 5' 0 5" (1 537 m), weight 112 kg (245 lb 13 oz), SpO2 97 % , RA, Body mass index is 47 22 kg/m²        Intake/Output Summary (Last 24 hours) at 08/15/18 1225  Last data filed at 08/15/18 0930   Gross per 24 hour   Intake              720 ml   Output              750 ml   Net              -30 ml         Physical Exam  Gen: Awake, alert, oriented x 3, no acute distress  HEENT: Mucous membranes moist, no oral lesions, no thrush  NECK: No accessory muscle use, JVP not elevated  Cardiac: Regular, tachycardic, single S1, single S2, no murmurs, no rubs, no gallops  Lungs:  Decreased breath sounds throughout bilaterally with scattered expiratory wheezes and coarse breath sounds throughout  No rales or rhonchi noted  Abdomen:  Obese, normoactive bowel sounds, soft nontender, nondistended, no rebound or rigidity, no guarding  Extremities: no cyanosis, no clubbing, no edema    Labs: I have personally reviewed pertinent lab results  , ABG: No results found for: PHART, TMR0RBZ, PO2ART, BKF0AKW, B4IHZTBV, BEART, SOURCE, BNP: No results found for: BNP, CBC:   Lab Results   Component Value Date    WBC 18 73 (H) 08/15/2018    HGB 14 1 08/15/2018    HCT 42 5 08/15/2018    MCV 90 08/15/2018     08/15/2018    MCH 29 8 08/15/2018    MCHC 33 2 08/15/2018    RDW 14 6 08/15/2018    MPV 11 2 08/15/2018   , CMP: No results found for: NA, K, CL, CO2, ANIONGAP, BUN, CREATININE, GLUCOSE, CALCIUM, AST, ALT, ALKPHOS, PROT, ALBUMIN, BILITOT, EGFR, PT/INR: No results found for: PT, INR, Troponin: No results found for: TROPONINI     Imaging and other studies: I have personally reviewed pertinent films in PACS    CTChest 8/13/18  No consolidations or effusions noted    There was the incidental finding and nonobstructing left renal calculi    Lalita Lundberg PA-C

## 2018-08-15 NOTE — ASSESSMENT & PLAN NOTE
· Suspect secondary to viral URI  · Pulmonology following - Discussed with pulm  · Per pulm recs, the patient's IV steroids were decreased to q12hr 8/14  Continue Xopenex/Atrovent nebs   Add Symbicort 160 two puffs BID  · Plan to switch to PO prednisone 8/16  · Will need PFTs as outpatient  · Follow up with pulm  · D/c w/ Breo as insurance does not cover Symbicort

## 2018-08-15 NOTE — SOCIAL WORK
CM met with pt at bedside and explained CM role  Pt lives with her  Jerson Hartmann [phone] in a 2 story home with 12 steps to enter  Pt's bedroom/bathroom are on the 1st floor  PTA pt was independent with ADL's and did not require the use of DME for ambulation  Pt uses [facility] on [address]  Pt requests that her prescriptions be filled at [facility] before d/c  Pt reports no hx of d&a rehabilitation or mental health issues  Pt's primary contact is her  Jerson Hartmann [phone]  Pt does not have a POA  CM reviewed d/c planning process including the following: identifying help at home, patient preference for d/c planning needs, Discharge Lounge, [facility] Meds to Bed program, availability of treatment team to discuss questions or concerns patient and/or family may have regarding understanding medications and recognizing signs and symptoms once discharged  CM also encouraged patient to follow up with all recommended appointments after discharge  Patient advised of importance for patient and family to participate in managing patients medical well being  Patient/caregiver received discharge checklist   Content reviewed  Patient/caregiver encouraged to participate in discharge plan of care prior to discharge home

## 2018-08-15 NOTE — PROGRESS NOTES
Progress Note - Jacobo Metzger 1980, 45 y o  female MRN: [id number]    Unit/Bed#: [facility] 610-01 Encounter: [id number]    Primary Care Provider: Papi Davenport MD   Date and time admitted to hospital: 8/13/2018  9:18 AM        * Mild intermittent asthma with acute exacerbation   Assessment & Plan    · Suspect secondary to viral URI  · Pulmonology following - Discussed with pulm  · Per pulm recs, the patient's IV steroids were decreased to q12hr 8/14  Continue Xopenex/Atrovent nebs  Add Symbicort 160 two puffs BID  · Plan to switch to PO prednisone 8/16  · Will need PFTs as outpatient  · Follow up with pulm  · D/c w/ Breo as insurance does not cover Symbicort        Leukocytosis   Assessment & Plan    · Likely steroid-induced        Hyperglycemia   Assessment & Plan    · HbA1c 5 6  · Denies history of diabetes   · Suspect worsening hyperglycemia 2/2 IV steroids  She is on SSI coverage and IV steroid frequency has been decreased        Tobacco abuse   Assessment & Plan    · On Chantix as outpatient  She is not interested in the nicotine patch  · Cessation was encouraged        Obese   Assessment & Plan    · Discussed diet, exercise, and weight loss  Patient reports that she is actively working towards this as outpatient        Cough   Assessment & Plan    · Suspect viral URI  · Supportive care          VTE Pharmacologic Prophylaxis:   Pharmacologic: Pharmacologic VTE Prophylaxis contraindicated due to low risk  Mechanical VTE Prophylaxis in Place: Yes    Patient Centered Rounds: I have performed bedside rounds with nursing staff today  Discussions with Specialists or Other Care Team Provider: pulm    Education and Discussions with Family / Patient: pt    Time Spent for Care: 30 minutes  More than 50% of total time spent on counseling and coordination of care as described above      Current Length of Stay: 1 day(s)    Current Patient Status: Inpatient   Certification Statement: The patient will continue to require additional inpatient hospital stay due to need for iv steroids    Discharge Plan: tomorrow if ok on oral prednisone    Code Status: Level 1 - Full Code      Subjective:   No acute complaints    Objective:     Vitals:   Temp (24hrs), Av 2 °F (36 8 °C), Min:98 1 °F (36 7 °C), Max:98 2 °F (36 8 °C)    HR:  [] 103  Resp:  [18-20] 20  BP: (117-144)/(73-91) 133/73  SpO2:  [95 %-98 %] 95 %  Body mass index is 40 28 kg/m²  Input and Output Summary (last 24 hours): Intake/Output Summary (Last 24 hours) at 08/15/18 1807  Last data filed at 08/15/18 1712   Gross per 24 hour   Intake              840 ml   Output             1100 ml   Net             -260 ml       Physical Exam:     Physical Exam   Constitutional: She is oriented to person, place, and time  No distress  HENT:   Head: Normocephalic and atraumatic  Eyes: Conjunctivae and EOM are normal    Neck: Normal range of motion  Neck supple  Cardiovascular: Normal rate and regular rhythm  Pulmonary/Chest: Effort normal  She has wheezes  Abdominal: Soft  Bowel sounds are normal  She exhibits no distension  There is no tenderness  Musculoskeletal: Normal range of motion  She exhibits no edema  Neurological: She is alert and oriented to person, place, and time  Skin: Skin is warm and dry  She is not diaphoretic  Additional Data:     Labs:      Results from last 7 days  Lab Units 08/15/18  0518 18  0556   WBC Thousand/uL 18 73* 15 71*   HEMOGLOBIN g/dL 14 1 14 0   HEMATOCRIT % 42 5 42 6   PLATELETS Thousands/uL 277 282   NEUTROS PCT %  --  82*   LYMPHS PCT %  --  14   MONOS PCT %  --  3*   EOS PCT %  --  0       Results from last 7 days  Lab Units 18  0556   SODIUM mmol/L 136   POTASSIUM mmol/L 3 6   CHLORIDE mmol/L 104   CO2 mmol/L 25   BUN mg/dL 16   CREATININE mg/dL 0 73   CALCIUM mg/dL 8 8   GLUCOSE RANDOM mg/dL 158*           * I Have Reviewed All Lab Data Listed Above    * Additional Pertinent Lab Tests Reviewed: Papa 66 Admission Reviewed        Recent Cultures (last 7 days):       Results from last 7 days  Lab Units 08/13/18 2046   C DIFF TOXIN B  NEGATIVE for C difficle toxin by PCR  Last 24 Hours Medication List:     Current Facility-Administered Medications:  acetaminophen 650 mg Oral Q6H PRN Dami Galicia PA-C   albuterol 2 puff Inhalation Q4H PRN Cristina Ramirez MD   aluminum-magnesium hydroxide-simethicone 5 mL Oral Q6H PRN Cristina Ramirez MD   benzonatate 100 mg Oral TID PRN Cristina Ramirez MD   budesonide-formoterol 2 puff Inhalation BID Zoe Elizabeth PA-C   docusate sodium 100 mg Oral BID Cristina Ramirez MD   enoxaparin 40 mg Subcutaneous Daily Cristina Ramirez MD   guaiFENesin 600 mg Oral Q12H [address], MD   ipratropium 0 5 mg Nebulization Q6H Cristina Ramirez MD   levalbuterol 1 25 mg Nebulization Q8H PRN Cristina Ramirez MD   levalbuterol 1 25 mg Nebulization Q6H Cristina Ramirez MD   methylPREDNISolone sodium succinate 40 mg Intravenous Once Kelly Barton PA-C   nicotine 1 patch Transdermal Daily Cristina Ramirez MD   ondansetron 4 mg Intravenous Q6H PRN Cristina Ramirez MD   [START ON 8/16/2018] predniSONE 40 mg Oral Daily Kelly Barton PA-C   traMADol 50 mg Oral Q6H PRN Dami Galicia PA-C        Today, Patient Was Seen By: Jasper Burger DO    ** Please Note: Dictation voice to text software may have been used in the creation of this document   **

## 2018-08-16 VITALS
SYSTOLIC BLOOD PRESSURE: 141 MMHG | WEIGHT: 245.81 LBS | TEMPERATURE: 98.42 F | HEART RATE: 88 BPM | OXYGEN SATURATION: 97 % | RESPIRATION RATE: 17 BRPM | BODY MASS INDEX: 39.51 KG/M2 | DIASTOLIC BLOOD PRESSURE: 73 MMHG | HEIGHT: 66 IN

## 2018-08-16 LAB
GLUCOSE SERPL-MCNC: 122 MG/DL (ref 65–140)
GLUCOSE SERPL-MCNC: 132 MG/DL (ref 65–140)

## 2018-08-16 PROCEDURE — 94760 N-INVAS EAR/PLS OXIMETRY 1: CPT

## 2018-08-16 PROCEDURE — 99232 SBSQ HOSP IP/OBS MODERATE 35: CPT | Performed by: INTERNAL MEDICINE

## 2018-08-16 PROCEDURE — 94640 AIRWAY INHALATION TREATMENT: CPT

## 2018-08-16 PROCEDURE — 99239 HOSP IP/OBS DSCHRG MGMT >30: CPT | Performed by: GENERAL PRACTICE

## 2018-08-16 PROCEDURE — 82948 REAGENT STRIP/BLOOD GLUCOSE: CPT

## 2018-08-16 PROCEDURE — 94668 MNPJ CHEST WALL SBSQ: CPT

## 2018-08-16 RX ORDER — GUAIFENESIN 600 MG
600 TABLET, EXTENDED RELEASE 12 HR ORAL EVERY 12 HOURS SCHEDULED
Qty: 30 TABLET | Refills: 0 | Status: SHIPPED | OUTPATIENT
Start: 2018-08-16 | End: 2020-03-18 | Stop reason: CLARIF

## 2018-08-16 RX ORDER — NICOTINE 21 MG/24HR
1 PATCH, TRANSDERMAL 24 HOURS TRANSDERMAL DAILY
Qty: 28 PATCH | Refills: 0 | Status: SHIPPED | OUTPATIENT
Start: 2018-08-17 | End: 2020-03-18 | Stop reason: CLARIF

## 2018-08-16 RX ORDER — BENZONATATE 100 MG/1
100 CAPSULE ORAL 3 TIMES DAILY PRN
Qty: 20 CAPSULE | Refills: 0 | Status: SHIPPED | OUTPATIENT
Start: 2018-08-16 | End: 2018-09-14 | Stop reason: SDUPTHER

## 2018-08-16 RX ORDER — PREDNISONE 10 MG/1
TABLET ORAL
Qty: 30 TABLET | Refills: 0 | Status: SHIPPED | OUTPATIENT
Start: 2018-08-16 | End: 2020-03-18 | Stop reason: CLARIF

## 2018-08-16 RX ADMIN — BUDESONIDE AND FORMOTEROL FUMARATE DIHYDRATE 2 PUFF: 160; 4.5 AEROSOL RESPIRATORY (INHALATION) at 08:42

## 2018-08-16 RX ADMIN — IPRATROPIUM BROMIDE 0.5 MG: 0.5 SOLUTION RESPIRATORY (INHALATION) at 07:30

## 2018-08-16 RX ADMIN — GUAIFENESIN 600 MG: 600 TABLET, EXTENDED RELEASE ORAL at 08:41

## 2018-08-16 RX ADMIN — LEVALBUTEROL HYDROCHLORIDE 1.25 MG: 1.25 SOLUTION, CONCENTRATE RESPIRATORY (INHALATION) at 00:50

## 2018-08-16 RX ADMIN — ENOXAPARIN SODIUM 40 MG: 40 INJECTION SUBCUTANEOUS at 08:41

## 2018-08-16 RX ADMIN — IPRATROPIUM BROMIDE 0.5 MG: 0.5 SOLUTION RESPIRATORY (INHALATION) at 00:49

## 2018-08-16 RX ADMIN — LEVALBUTEROL HYDROCHLORIDE 1.25 MG: 1.25 SOLUTION, CONCENTRATE RESPIRATORY (INHALATION) at 07:30

## 2018-08-16 RX ADMIN — PREDNISONE 40 MG: 20 TABLET ORAL at 08:41

## 2018-08-16 RX ADMIN — DOCUSATE SODIUM 100 MG: 100 CAPSULE, LIQUID FILLED ORAL at 08:41

## 2018-08-16 NOTE — SOCIAL WORK
Scripts sent to [facility] [address] per pt's request to be filled prior to pt's d/c today

## 2018-08-16 NOTE — MEDICAL STUDENT
MEDICAL STUDENT NOTE   For teaching purposes only, not part of the medical record    Progress Note - Zohra Evans 45 y o  female MRN: [id number]  Unit/Bed#: [facility] 610-01 Encounter: [id number]    Assessment:  1  Mild intermittent asthma, acute exacerbation  2  Cough   3  Leukocytosis  4  Hyperglycemia    Plan:  1  Mild intermittent asthma, acute exacerbation   - patient denies SOB, chest pain, however is still wheezing on exam   - her respiratory effort is normal and she is not laboring to breath    - transition to PO prednisone 40 mg daily   - Symbicort BID   - continue Q8h xopenex   - duoneb Q6   - transition pt to breo and atrovent PRN, she is stable for discharge today  She will need an 11 day  course of PO prednisone  2  Cough    - secondary to viral URI   - improving  3  Leukocytosis   - secondary to steroids  4  Hyperglycemia   - secondary to steroids   - pt without history of DM    HPI/24 hour events:  Mrs Anam Gamez states she is doing well today  She states she slept well through the night and denies any shortness of breath  She still has an intermittent dry cough  She denies chest pain, wheezing, fever  She states she would like to go home today  She had a normal BM this AM after receiving colace last night  Vitals:   Temp (24hrs), Av 3 °F (36 8 °C), Min:98 1 °F (36 7 °C), Max:98 42 °F (36 9 °C)    HR:  [] 88  Resp:  [17-20] 17  BP: (133-141)/(73) 141/73  SpO2:  [94 %-97 %] 97 %  I/O last 24 hours:   In: 1180 [P O :1180]  Out: 1200 [Urine:1200]    Physical Exam:  GENERAL: sitting up in bed, no acute distress  HEAD: normocephalic, atraumatic  NECK: no JVD  CARDIAC: normal rate, regular rhythm, no murmur  LUNGS: normal respiratory rate, normal effort, no accessory muscle use, scattered end-expiratory wheezes in the lower lung fields bilaterally  ABDOMEN: soft, NT, ND  MSK/SKIN: no edema    Labs:    Results from last 7 days  Lab Units 08/15/18  0518 18  0556 188 08/13/18  1112   WBC Thousand/uL 18 73* 15 71*  --  9 67   HEMOGLOBIN g/dL 14 1 14 0  --  14 5   HEMATOCRIT % 42 5 42 6  --  43 7   PLATELETS Thousands/uL 277 282 287 262       Results from last 7 days  Lab Units 08/14/18  0556 08/13/18  1111   SODIUM mmol/L 136 139   POTASSIUM mmol/L 3 6 3 5   CHLORIDE mmol/L 104 107   CO2 mmol/L 25 25   BUN mg/dL 16 18   CREATININE mg/dL 0 73 0 80   CALCIUM mg/dL 8 8 8 7   GLUCOSE RANDOM mg/dL 158* 155*           Imaging:   Xr Chest 2 Views    Result Date: 8/13/2018  Impression No acute cardiopulmonary disease  Workstation performed: [id number]     No CTA results available for this patient      Medications:    Current Facility-Administered Medications:     acetaminophen (TYLENOL) tablet 650 mg, 650 mg, Oral, Q6H PRN, Bing Bryant PA-C, 650 mg at 08/14/18 1445    albuterol (PROVENTIL HFA,VENTOLIN HFA) inhaler 2 puff, 2 puff, Inhalation, Q4H PRN, Divya Smith MD    aluminum-magnesium hydroxide-simethicone (MYLANTA) 200-200-20 mg/5 mL oral suspension 5 mL, 5 mL, Oral, Q6H PRN, Divya Smith MD    benzonatate (TESSALON PERLES) capsule 100 mg, 100 mg, Oral, TID PRN, Divya Smith MD, 100 mg at 08/15/18 0850    budesonide-formoterol (SYMBICORT) 160-4 5 mcg/act inhaler 2 puff, 2 puff, Inhalation, BID, Mahamed Martines PA-C, 2 puff at 08/15/18 1858    docusate sodium (COLACE) capsule 100 mg, 100 mg, Oral, BID, Divya Smith MD, 100 mg at 08/15/18 2342    enoxaparin (LOVENOX) subcutaneous injection 40 mg, 40 mg, Subcutaneous, Daily, Divya Smith MD, 40 mg at 08/15/18 0851    guaiFENesin (MUCINEX) 12 hr tablet 600 mg, 600 mg, Oral, Q12H [facility], Divya Smith MD, 600 mg at 08/15/18 2028    ipratropium (ATROVENT) 0 02 % inhalation solution 0 5 mg, 0 5 mg, Nebulization, Q6H, Divya Smith MD, 0 5 mg at 08/16/18 0730    levalbuterol (XOPENEX) inhalation solution 1 25 mg, 1 25 mg, Nebulization, Q8H PRN, MD Miller Hathaway levalbuterol (XOPENEX) inhalation solution 1 25 mg, 1 25 mg, Nebulization, Q6H, Lorena Arango MD, 1 25 mg at 08/16/18 0730    nicotine (NICODERM CQ) 14 mg/24hr TD 24 hr patch 1 patch, 1 patch, Transdermal, Daily, Lorena Arango MD    ondansetron [facility]) injection 4 mg, 4 mg, Intravenous, Q6H PRN, Lorena Arango MD    predniSONE tablet 40 mg, 40 mg, Oral, Daily, Rose Menchaca PA-C    traMADol (ULTRAM) tablet 50 mg, 50 mg, Oral, Q6H PRN, Joanne Clark PA-C  No current facility-administered medications on file prior to encounter  No current outpatient prescriptions on file prior to encounter         VTE Pharmacologic Prophylaxis: Enoxaparin (Lovenox)  VTE Mechanical Prophylaxis: sequential compression device    [address] Student

## 2018-08-16 NOTE — PROGRESS NOTES
Progress Note - Pulmonary   Jesika Brooks 45 y o  female MRN: [id number]  Unit/Bed#: [facility] 610-01 Encounter: [id number]      Assessment:  1  Mild intermittent asthma with acute exacerbation  2   Tobacco abuse    Plan:  1  Continue Xopenex/Atrovent nebs Q 6  2  On discharge continue her albuterol and add Breo which patient reports is covered by her insurance  3   Continue prednisone taper by 10 mg every 3 days  4  Smoking cessation discussed and nicotine replacement therapy recommended as outpatient  5   See discharge instructions for outpatient pulmonary follow-up  Subjective:   Patient sitting up in bed in no apparent distress anxious to go home  No supplemental oxygen needed  Patient states minimal shortness of breath and was 98% saturation on room air during my examination  Currently denies fevers, chills, cough, hemoptysis, abdominal pain, headaches, dizziness, nausea, vomiting, diarrhea, calf pain    Objective:   Vitals: Blood pressure 141/73, pulse 88, temperature 98 42 °F (36 9 °C), temperature source Oral, resp  rate 17, height 5' 5 5" (1 664 m), weight 112 kg (245 lb 13 oz), SpO2 97 % , room-air, Body mass index is 40 28 kg/m²  Intake/Output Summary (Last 24 hours) at 08/16/18 1352  Last data filed at 08/16/18 0934   Gross per 24 hour   Intake              700 ml   Output              800 ml   Net             -100 ml         Physical Exam  Gen: Awake, alert, oriented x 3, no acute distress  HEENT: Mucous membranes moist, no oral lesions, no thrush  NECK: No accessory muscle use  Cardiac: Regular, single S1, single S2, no murmurs, no rubs, no gallops  Lungs:  Few faint expiratory wheezes  Abdomen: normoactive bowel sounds, soft nontender, nondistended, no rebound or rigidity, no guarding  Extremities: no cyanosis, no clubbing, no edema    Labs: I have personally reviewed pertinent lab results  , ABG: No results found for: PHART, UNB4XCC, PO2ART, FEJ8URH, K1FPXHUA, BEART, SOURCE, BNP: No results found for: BNP, CBC: No results found for: WBC, HGB, HCT, MCV, PLT, ADJUSTEDWBC, MCH, MCHC, RDW, MPV, NRBC, CMP: No results found for: NA, K, CL, CO2, ANIONGAP, BUN, CREATININE, GLUCOSE, CALCIUM, AST, ALT, ALKPHOS, PROT, ALBUMIN, BILITOT, EGFR, PT/INR: No results found for: PT, INR, Troponin: No results found for: TROPONINI  Imaging and other studies: I have personally reviewed pertinent reports     and I have personally reviewed pertinent films in PACS      Ruth José PA-C

## 2018-08-16 NOTE — DISCHARGE INSTRUCTIONS
Asthma   WHAT YOU NEED TO KNOW:   Asthma is a lung disease that makes breathing difficult  Chronic inflammation and reactions to triggers narrow the airways in the lungs  Asthma can become life-threatening if it is not managed  DISCHARGE INSTRUCTIONS:   Seek care immediately if:   · You have severe shortness of breath  · Your lips or nails turn blue or gray  · The skin around your neck and ribs pulls in with each breath  · You have shortness of breath, even after you take your short-term medicine as directed  · Your peak flow numbers are in the red zone of your AAP  Contact your healthcare provider if:   · You run out of medicine before your next refill is due  · Your symptoms get worse  · You need to take more medicine than usual to control your symptoms  · You have questions or concerns about your condition or care  Medicines:   · Medicines  decrease inflammation, open airways, and make it easier to breathe  Medicines may be inhaled, taken as a pill, or injected  Short-term medicines relieve your symptoms quickly  Long-term medicines are used to prevent future attacks  You may also need medicine to help control your allergies  Ask your healthcare provider for more information about the medicine you are given and how to take it safely  · Take your medicine as directed  Contact your healthcare provider if you think your medicine is not helping or if you have side effects  Tell him or her if you are allergic to any medicine  Keep a list of the medicines, vitamins, and herbs you take  Include the amounts, and when and why you take them  Bring the list or the pill bottles to follow-up visits  Carry your medicine list with you in case of an emergency  Follow up with your healthcare provider as directed: You will need to return to make sure your medicine is working and your symptoms are controlled   You may be referred to an asthma specialist  Todd Shook may be asked to keep a record of your peak flow values and bring it with you to your appointments  Write down your questions so you remember to ask them  Manage your symptoms and prevent future attacks:   · Follow your Asthma Action Plan (AAP)  This is a written plan that you and your healthcare provider create  It explains which medicine you need and when to change doses if necessary  It also explains how you can monitor symptoms and use a peak flow meter  The meter measures how well your lungs are working  · Manage other health conditions , such as allergies, acid reflux, and sleep apnea  · Identify and avoid triggers  These may include pets, dust mites, mold, and cockroaches  · Do not smoke or be around others who smoke  Nicotine and other chemicals in cigarettes and cigars can cause lung damage  Ask your healthcare provider for information if you currently smoke and need help to quit  E-cigarettes or smokeless tobacco still contain nicotine  Talk to your healthcare provider before you use these products  · Ask about the flu vaccine  The flu can make your asthma worse  You may need a yearly flu shot  © 2017 [employer]  Information is for End User's use only and may not be sold, redistributed or otherwise used for commercial purposes  All illustrations and images included in CareNotes® are the copyrighted property of [employer]  or [employer]  The above information is an  only  It is not intended as medical advice for individual conditions or treatments  Talk to your doctor, nurse or pharmacist before following any medical regimen to see if it is safe and effective for you

## 2018-08-16 NOTE — NURSING NOTE
Reviewed AVS with pt  Pt filled Rx's for Prednisone, Tessalon Pearls and Breo with [facility]  Pt refused Rx for Nicotene patch  Refused smoking cessation material  Pt's bs 122 before pt leaving  Pt given a note for employer

## 2018-08-16 NOTE — DISCHARGE SUMMARY
Discharge- Iman Has 1980, 45 y o  female MRN: [id number]    Unit/Bed#: [facility] 610-01 Encounter: [id number]    Primary Care Provider: Annemarie Alex MD   Date and time admitted to hospital: 8/13/2018  9:18 AM        * Mild intermittent asthma with acute exacerbation   Assessment & Plan    · Suspect secondary to viral URI  · Pulmonology following - Discussed with pulm  · Per pulm recs, the patient's IV steroids were decreased to q12hr 8/14  Continue Xopenex/Atrovent nebs  Add Symbicort 160 two puffs BID  · switch to PO prednisone 8/16, and d/c with 12 day taper  · Will need PFTs as outpatient  · Follow up with pulm  · D/c w/ Breo as insurance does not cover Symbicort        Leukocytosis   Assessment & Plan    · Likely steroid-induced        Hyperglycemia   Assessment & Plan    · HbA1c 5 6  · Denies history of diabetes   · hyperglycemia 2/2 IV steroids  Tobacco abuse   Assessment & Plan    · On Chantix as outpatient  · Cessation was encouraged        Obese   Assessment & Plan    · Discussed diet, exercise, and weight loss   Patient reports that she is actively working towards this as outpatient        Cough   Assessment & Plan    · Suspect viral URI  · Supportive care          Discharging Physician / Practitioner: Landon Kemp DO  PCP: Annemarie Alex MD  Admission Date:   Admission Orders     Ordered        08/14/18 1153  Inpatient Admission  Once         08/13/18 1355  Place in Observation (expected length of stay for this patient is less than two midnights)  Once             Discharge Date: 08/16/18    Resolved Problems  Date Reviewed: 8/16/2018    None          Consultations During Hospital Stay:  · Dr Quintin Carballo - pulm    Procedures Performed:     · none    Significant Findings / Test Results:     · CT chest WNL    Incidental Findings:   · none     Test Results Pending at Discharge (will require follow up):   · none     Outpatient Tests Requested:  · PFTs w/ pulm    Complications:  none    Reason for Admission: asthma exac requiring IV steroids    Hospital Course:     Kvng Romo is a 45 y o  female patient who originally presented to the hospital on 8/13/2018 due to asthma exac  Pt placed on iv steroids which were weaned and pt d/c on po prednisone taper  PT placed on Breo at d/c  Please see above list of diagnoses and related plan for additional information  Condition at Discharge: stable     Discharge Day Visit / Exam:     Subjective:  No acute complaints  Vitals: Blood Pressure: 141/73 (08/15/18 2333)  Pulse: 88 (08/16/18 0730)  Temperature: 98 42 °F (36 9 °C) (08/15/18 2333)  Temp Source: Oral (08/15/18 2333)  Respirations: 17 (08/16/18 0730)  Height: 5' 5 5" (166 4 cm) (per pt) (08/15/18 1332)  Weight - Scale: 112 kg (245 lb 13 oz) (admit wt on standing scale) (08/15/18 1332)  SpO2: 97 % (08/16/18 0730)  Exam:   Physical Exam   Constitutional: She is oriented to person, place, and time  No distress  HENT:   Head: Normocephalic and atraumatic  Eyes: Conjunctivae and EOM are normal    Neck: Normal range of motion  Neck supple  Cardiovascular: Normal rate and regular rhythm  Pulmonary/Chest: Effort normal  She has wheezes  She has no rales  Abdominal: Soft  Bowel sounds are normal  She exhibits no distension  There is no tenderness  Musculoskeletal: Normal range of motion  She exhibits no edema  Neurological: She is alert and oriented to person, place, and time  Skin: Skin is warm and dry  She is not diaphoretic  Discussion with Family: yes    Discharge instructions/Information to patient and family:   See after visit summary for information provided to patient and family  Provisions for Follow-Up Care:  See after visit summary for information related to follow-up care and any pertinent home health orders        Disposition:     Home    For Discharges to [facility]F:   · Not Applicable to this Patient - Not Applicable to this Patient    Planned Readmission: no     Discharge Statement:  I spent 35 minutes discharging the patient  This time was spent on the day of discharge  I had direct contact with the patient on the day of discharge  Greater than 50% of the total time was spent examining patient, answering all patient questions, arranging and discussing plan of care with patient as well as directly providing post-discharge instructions  Additional time then spent on discharge activities  Discharge Medications:  See after visit summary for reconciled discharge medications provided to patient and family        ** Please Note: This note has been constructed using a voice recognition system **

## 2018-09-13 RX ORDER — NAPROXEN 500 MG/1
TABLET ORAL
COMMUNITY
End: 2020-03-18 | Stop reason: CLARIF

## 2018-09-14 ENCOUNTER — OFFICE VISIT (OUTPATIENT)
Dept: PULMONOLOGY | Facility: CLINIC | Age: 38
End: 2018-09-14
Payer: COMMERCIAL

## 2018-09-14 VITALS
HEART RATE: 78 BPM | DIASTOLIC BLOOD PRESSURE: 78 MMHG | WEIGHT: 249 LBS | RESPIRATION RATE: 16 BRPM | HEIGHT: 65 IN | TEMPERATURE: 97 F | SYSTOLIC BLOOD PRESSURE: 120 MMHG | OXYGEN SATURATION: 96 % | BODY MASS INDEX: 41.48 KG/M2

## 2018-09-14 DIAGNOSIS — Z72.0 TOBACCO ABUSE: ICD-10-CM

## 2018-09-14 DIAGNOSIS — J45.21 MILD INTERMITTENT ASTHMA WITH ACUTE EXACERBATION: Primary | ICD-10-CM

## 2018-09-14 DIAGNOSIS — R05.9 COUGH: ICD-10-CM

## 2018-09-14 PROCEDURE — 99214 OFFICE O/P EST MOD 30 MIN: CPT | Performed by: PHYSICIAN ASSISTANT

## 2018-09-14 RX ORDER — BUPROPION HYDROCHLORIDE 100 MG/1
100 TABLET ORAL 2 TIMES DAILY
COMMUNITY
End: 2020-03-18 | Stop reason: CLARIF

## 2018-09-14 RX ORDER — BENZONATATE 100 MG/1
100 CAPSULE ORAL 3 TIMES DAILY PRN
Qty: 20 CAPSULE | Refills: 2 | Status: SHIPPED | OUTPATIENT
Start: 2018-09-14 | End: 2020-03-18 | Stop reason: CLARIF

## 2018-09-14 NOTE — PROGRESS NOTES
Assessment:    1  Mild intermittent asthma with acute exacerbation  Pulmonary function test    benzonatate (TESSALON PERLES) 100 mg capsule   2  Tobacco abuse  Pulmonary function test   3  Cough  benzonatate (TESSALON PERLES) 100 mg capsule       08/13/2018 CT of chest reviewed  Impression was no evidence pneumonia  Lungs are clear there is no tracheal or endobronchial lesions  Plan:    Ms Lj March still smoking approximately 6 cigarettes daily  Smoking cessation encouraged and discussed  She will continue her Breo 100-25 daily and albuterol Q 4 p r n  Wanda Hernandez Two samples of Breo given to patient  Patient states gets relief of occasional cough from [facility] which were reordered  Pulmonary function testing ordered  Patient scheduled to follow up approximately 3-4 months  She was instructed to call us with any questions /concerns or worsening symptoms  Subjective:     Patient ID: Sachi Tobin is a 45 y o  female  Chief Complaint:  HPI   44-year-old female recently hospitalized for mild intermittent asthma with acute exacerbation and tobacco abuse  Patient is currently  Breo 100-25 q day and albuterol which she is using approximately twice daily  States breathing much easier compared to her hospitalization and does note some occasional audible wheezing and dry cough  Currently denies any shortness of breath or dyspnea with exertion  Does admit to only getting shortness of breath when she has her coughing spells  Patient continues to smoke however has wean down to approximately 6 cigarettes daily  Review of Systems   Constitutional: Negative for activity change, appetite change, chills, diaphoresis, fatigue and fever  HENT: Positive for congestion  Negative for rhinorrhea, sinus pain, sinus pressure, sneezing and sore throat  Respiratory: Positive for cough and wheezing  Negative for apnea, choking, chest tightness, shortness of breath and stridor  Cardiovascular: Negative  Negative for chest pain, palpitations and leg swelling  Gastrointestinal: Negative  Negative for abdominal pain  Genitourinary: Negative  Musculoskeletal: Negative for myalgias, neck pain and neck stiffness  Skin: Negative for rash  Neurological: Negative for dizziness and headaches  Psychiatric/Behavioral: Negative  Past Medical History:   Diagnosis Date    Asthma     Kidney stones          Objective:  /78   Pulse 78   Temp (!) 97 °F (36 1 °C)   Resp 16   Ht 5' 5" (1 651 m)   Wt 113 kg (249 lb)   SpO2 96%   BMI 41 44 kg/m²     Room-air  Physical Exam   Constitutional: She appears well-developed and well-nourished  No distress  HENT:   Head: Normocephalic  Mouth/Throat: Oropharynx is clear and moist    Eyes: Conjunctivae are normal  No scleral icterus  Neck: Neck supple  No tracheal deviation present  Cardiovascular: Normal rate, regular rhythm, normal heart sounds and intact distal pulses  Exam reveals no gallop and no friction rub  No murmur heard  Pulmonary/Chest: Effort normal  No respiratory distress  She has wheezes (  Few very faint scattered expiratory wheezes  However denies any shortness of breath at this time  )  She has no rales  She exhibits no tenderness  Abdominal: Soft  Bowel sounds are normal  There is no tenderness  Musculoskeletal: She exhibits no edema or tenderness  Lymphadenopathy:     She has no cervical adenopathy  Neurological: She is alert  Skin: Skin is warm and dry  Capillary refill takes less than 2 seconds  She is not diaphoretic  Psychiatric: She has a normal mood and affect  Nursing note and vitals reviewed

## 2018-09-26 ENCOUNTER — HOSPITAL ENCOUNTER (OUTPATIENT)
Dept: PULMONOLOGY | Facility: HOSPITAL | Age: 38
Discharge: HOME | End: 2018-09-26
Attending: INTERNAL MEDICINE
Payer: COMMERCIAL

## 2018-09-26 DIAGNOSIS — J45.21 MILD INTERMITTENT ASTHMA WITH ACUTE EXACERBATION: ICD-10-CM

## 2018-09-26 DIAGNOSIS — Z72.0 TOBACCO ABUSE: ICD-10-CM

## 2018-09-26 PROCEDURE — 94060 EVALUATION OF WHEEZING: CPT

## 2018-09-26 PROCEDURE — 94726 PLETHYSMOGRAPHY LUNG VOLUMES: CPT

## 2018-09-26 PROCEDURE — 94729 DIFFUSING CAPACITY: CPT

## 2018-09-26 PROCEDURE — 94760 N-INVAS EAR/PLS OXIMETRY 1: CPT

## 2018-09-26 PROCEDURE — 94060 EVALUATION OF WHEEZING: CPT | Performed by: INTERNAL MEDICINE

## 2018-09-26 PROCEDURE — 94729 DIFFUSING CAPACITY: CPT | Performed by: INTERNAL MEDICINE

## 2018-09-26 PROCEDURE — 94726 PLETHYSMOGRAPHY LUNG VOLUMES: CPT | Performed by: INTERNAL MEDICINE

## 2018-09-26 RX ORDER — ALBUTEROL SULFATE 2.5 MG/3ML
2.5 SOLUTION RESPIRATORY (INHALATION) ONCE
Status: COMPLETED | OUTPATIENT
Start: 2018-09-26 | End: 2018-09-26

## 2018-09-26 RX ADMIN — ALBUTEROL SULFATE 2.5 MG: 2.5 SOLUTION RESPIRATORY (INHALATION) at 17:12

## 2018-11-04 ENCOUNTER — HOSPITAL ENCOUNTER (EMERGENCY)
Facility: HOSPITAL | Age: 38
Discharge: HOME | End: 2018-11-04
Attending: EMERGENCY MEDICINE

## 2018-11-04 ENCOUNTER — APPOINTMENT (EMERGENCY)
Dept: RADIOLOGY | Facility: HOSPITAL | Age: 38
End: 2018-11-04

## 2018-11-04 VITALS
HEIGHT: 65 IN | WEIGHT: 242 LBS | BODY MASS INDEX: 40.32 KG/M2 | SYSTOLIC BLOOD PRESSURE: 140 MMHG | OXYGEN SATURATION: 97 % | RESPIRATION RATE: 20 BRPM | DIASTOLIC BLOOD PRESSURE: 79 MMHG | HEART RATE: 51 BPM | TEMPERATURE: 97.1 F

## 2018-11-04 DIAGNOSIS — N13.2 URETERAL STONE WITH HYDRONEPHROSIS: Primary | ICD-10-CM

## 2018-11-04 LAB
ALBUMIN SERPL BCP-MCNC: 3.4 G/DL (ref 3.5–5)
ALP SERPL-CCNC: 95 U/L (ref 46–116)
ALT SERPL W P-5'-P-CCNC: 22 U/L (ref 12–78)
ANION GAP SERPL CALCULATED.3IONS-SCNC: 6 MMOL/L (ref 4–13)
AST SERPL W P-5'-P-CCNC: 29 U/L (ref 5–45)
BACTERIA UR QL AUTO: ABNORMAL /HPF
BASOPHILS # BLD AUTO: 0.07 THOUSANDS/ΜL (ref 0–0.1)
BASOPHILS NFR BLD AUTO: 1 % (ref 0–1)
BILIRUB SERPL-MCNC: 0.37 MG/DL (ref 0.2–1)
BILIRUB UR QL STRIP: ABNORMAL
BUN SERPL-MCNC: 21 MG/DL (ref 5–25)
CALCIUM SERPL-MCNC: 8.5 MG/DL (ref 8.3–10.1)
CHLORIDE SERPL-SCNC: 107 MMOL/L (ref 100–108)
CLARITY UR: ABNORMAL
CO2 SERPL-SCNC: 21 MMOL/L (ref 21–32)
COLOR UR: ABNORMAL
COLOR, POC: NORMAL
CREAT SERPL-MCNC: 0.92 MG/DL (ref 0.6–1.3)
EOSINOPHIL # BLD AUTO: 0.11 THOUSAND/ΜL (ref 0–0.61)
EOSINOPHIL NFR BLD AUTO: 1 % (ref 0–6)
ERYTHROCYTE [DISTWIDTH] IN BLOOD BY AUTOMATED COUNT: 14.1 % (ref 11.6–15.1)
EXT PREG TEST URINE: NEGATIVE
GFR SERPL CREATININE-BSD FRML MDRD: 79 ML/MIN/1.73SQ M
GLUCOSE SERPL-MCNC: 153 MG/DL (ref 65–140)
GLUCOSE UR STRIP-MCNC: ABNORMAL MG/DL
HCT VFR BLD AUTO: 44.7 % (ref 34.8–46.1)
HGB BLD-MCNC: 14.8 G/DL (ref 11.5–15.4)
HGB UR QL STRIP.AUTO: ABNORMAL
IMM GRANULOCYTES # BLD AUTO: 0.06 THOUSAND/UL (ref 0–0.2)
IMM GRANULOCYTES NFR BLD AUTO: 0 % (ref 0–2)
KETONES UR STRIP-MCNC: ABNORMAL MG/DL
LEUKOCYTE ESTERASE UR QL STRIP: ABNORMAL
LIPASE SERPL-CCNC: 69 U/L (ref 73–393)
LYMPHOCYTES # BLD AUTO: 2.55 THOUSANDS/ΜL (ref 0.6–4.47)
LYMPHOCYTES NFR BLD AUTO: 17 % (ref 14–44)
MCH RBC QN AUTO: 29.9 PG (ref 26.8–34.3)
MCHC RBC AUTO-ENTMCNC: 33.1 G/DL (ref 31.4–37.4)
MCV RBC AUTO: 90 FL (ref 82–98)
MONOCYTES # BLD AUTO: 0.72 THOUSAND/ΜL (ref 0.17–1.22)
MONOCYTES NFR BLD AUTO: 5 % (ref 4–12)
NEUTROPHILS # BLD AUTO: 11.75 THOUSANDS/ΜL (ref 1.85–7.62)
NEUTS SEG NFR BLD AUTO: 76 % (ref 43–75)
NITRITE UR QL STRIP: ABNORMAL
NON-SQ EPI CELLS URNS QL MICRO: ABNORMAL /HPF
NRBC BLD AUTO-RTO: 0 /100 WBCS
PLATELET # BLD AUTO: 290 THOUSANDS/UL (ref 149–390)
PMV BLD AUTO: 11 FL (ref 8.9–12.7)
POTASSIUM SERPL-SCNC: 4.6 MMOL/L (ref 3.5–5.3)
PROT SERPL-MCNC: 7 G/DL (ref 6.4–8.2)
PROT UR STRIP-MCNC: ABNORMAL MG/DL
RBC # BLD AUTO: 4.95 MILLION/UL (ref 3.81–5.12)
RBC #/AREA URNS AUTO: ABNORMAL /HPF
SODIUM SERPL-SCNC: 134 MMOL/L (ref 136–145)
SP GR UR STRIP.AUTO: 1.02 (ref 1–1.03)
UROBILINOGEN UR QL STRIP.AUTO: ABNORMAL E.U./DL
WBC # BLD AUTO: 15.26 THOUSAND/UL (ref 4.31–10.16)
WBC #/AREA URNS AUTO: ABNORMAL /HPF

## 2018-11-04 PROCEDURE — 36415 COLL VENOUS BLD VENIPUNCTURE: CPT

## 2018-11-04 PROCEDURE — 96374 THER/PROPH/DIAG INJ IV PUSH: CPT

## 2018-11-04 PROCEDURE — 96375 TX/PRO/DX INJ NEW DRUG ADDON: CPT

## 2018-11-04 PROCEDURE — 80053 COMPREHEN METABOLIC PANEL: CPT | Performed by: EMERGENCY MEDICINE

## 2018-11-04 PROCEDURE — 81001 URINALYSIS AUTO W/SCOPE: CPT | Performed by: EMERGENCY MEDICINE

## 2018-11-04 PROCEDURE — 85025 COMPLETE CBC W/AUTO DIFF WBC: CPT | Performed by: EMERGENCY MEDICINE

## 2018-11-04 PROCEDURE — 83690 ASSAY OF LIPASE: CPT | Performed by: EMERGENCY MEDICINE

## 2018-11-04 PROCEDURE — 81025 URINE PREGNANCY TEST: CPT | Performed by: EMERGENCY MEDICINE

## 2018-11-04 PROCEDURE — 74176 CT ABD & PELVIS W/O CONTRAST: CPT

## 2018-11-04 PROCEDURE — 99284 EMERGENCY DEPT VISIT MOD MDM: CPT

## 2018-11-04 PROCEDURE — 87086 URINE CULTURE/COLONY COUNT: CPT | Performed by: EMERGENCY MEDICINE

## 2018-11-04 RX ORDER — ONDANSETRON 2 MG/ML
4 INJECTION INTRAMUSCULAR; INTRAVENOUS ONCE
Status: COMPLETED | OUTPATIENT
Start: 2018-11-04 | End: 2018-11-04

## 2018-11-04 RX ORDER — MORPHINE SULFATE 10 MG/ML
6 INJECTION, SOLUTION INTRAMUSCULAR; INTRAVENOUS ONCE
Status: COMPLETED | OUTPATIENT
Start: 2018-11-04 | End: 2018-11-04

## 2018-11-04 RX ORDER — KETOROLAC TROMETHAMINE 30 MG/ML
15 INJECTION, SOLUTION INTRAMUSCULAR; INTRAVENOUS ONCE
Status: COMPLETED | OUTPATIENT
Start: 2018-11-04 | End: 2018-11-04

## 2018-11-04 RX ORDER — OXYCODONE HYDROCHLORIDE AND ACETAMINOPHEN 5; 325 MG/1; MG/1
1 TABLET ORAL EVERY 6 HOURS PRN
Qty: 12 TABLET | Refills: 0 | Status: SHIPPED | OUTPATIENT
Start: 2018-11-04 | End: 2020-03-18 | Stop reason: CLARIF

## 2018-11-04 RX ADMIN — KETOROLAC TROMETHAMINE 15 MG: 30 INJECTION, SOLUTION INTRAMUSCULAR at 11:08

## 2018-11-04 RX ADMIN — ONDANSETRON 4 MG: 2 INJECTION INTRAMUSCULAR; INTRAVENOUS at 11:08

## 2018-11-04 RX ADMIN — MORPHINE SULFATE 6 MG: 10 INJECTION INTRAVENOUS at 12:54

## 2018-11-04 NOTE — ED PROVIDER NOTES
History  Chief Complaint   Patient presents with    Flank Pain     pt c/o pain that started with right flank pain that radiates throughout stomach  associated with vomiting  44 yo F presents to ED with L sided flank pain radiating to groin  Symptoms started this morning, constant  Pt says it feels like previous kidney stone but worse  She denies dysuria  No fever/chills  +nausea/vomiting  Prior to Admission Medications   Prescriptions Last Dose Informant Patient Reported? Taking? albuterol (PROVENTIL HFA,VENTOLIN HFA) 90 mcg/act inhaler   Yes No   Sig: Inhale 2 puffs every 6 (six) hours as needed for wheezing   benzonatate (TESSALON PERLES) 100 mg capsule   No No   Sig: Take 1 capsule (100 mg total) by mouth 3 (three) times a day as needed for cough   buPROPion (WELLBUTRIN) 100 mg tablet  Self Yes No   Sig: Take 100 mg by mouth 2 (two) times a day   fluticasone-vilanterol (BREO ELLIPTA) 100-25 mcg/inh inhaler   No No   Sig: Inhale 1 puff daily Rinse mouth after use     guaiFENesin (MUCINEX) 600 mg 12 hr tablet   No No   Sig: Take 1 tablet (600 mg total) by mouth every 12 (twelve) hours   naproxen (NAPROSYN) 500 mg tablet   Yes No   Sig: Take by mouth   nicotine (NICODERM CQ) 14 mg/24hr TD 24 hr patch   No No   Sig: Place 1 patch on the skin daily   Patient not taking: Reported on 9/14/2018    predniSONE 10 mg tablet   No No   Sig: Take 4 tabs PO daily x3d, then 3 tabs PO daily x 3d, then 2 tabs PO daily x 3d, then 1 tab PO daily   Patient not taking: Reported on 9/14/2018    varenicline (CHANTIX SARAH) 0 5 MG X 11 & 1 MG X 42 tablet  Self Yes No   Sig: Take 1 mg by mouth 2 (two) times a day Take one 0 5mg tab by mouth 1x daily for 3 days, then increase to one 0 5mg tab 2x daily for 3 days, then increase to one 1mg tab 2x daily      Facility-Administered Medications: None       Past Medical History:   Diagnosis Date    Asthma     Kidney stones        Past Surgical History:   Procedure Laterality Date    CHOLECYSTECTOMY      TUBAL LIGATION         Family History   Problem Relation Age of Onset    Diabetes Mother     COPD Mother     Diabetes Father     Heart disease Father     Heart failure Father      I have reviewed and agree with the history as documented  Social History   Substance Use Topics    Smoking status: Current Every Day Smoker     Packs/day: 1 00     Years: 29 00     Types: Cigarettes    Smokeless tobacco: Current User      Comment: No cigarettes since 08/10/2018, was down to half a pack a day    Alcohol use No        Review of Systems   Constitutional: Negative for activity change, chills and fever  HENT: Negative for congestion, rhinorrhea, sore throat and trouble swallowing  Eyes: Negative for pain, discharge and visual disturbance  Respiratory: Negative for cough, chest tightness and shortness of breath  Cardiovascular: Negative for chest pain, palpitations and leg swelling  Gastrointestinal: Positive for abdominal pain, nausea and vomiting  Genitourinary: Positive for flank pain  Negative for dysuria, frequency and urgency  Musculoskeletal: Negative for gait problem, neck pain and neck stiffness  Skin: Negative for color change, rash and wound  Neurological: Negative for dizziness, syncope, light-headedness and headaches  Physical Exam  ED Triage Vitals [11/04/18 0941]   Temperature Pulse Respirations Blood Pressure SpO2   (!) 97 1 °F (36 2 °C) 56 (!) 24 159/100 99 %      Temp Source Heart Rate Source Patient Position - Orthostatic VS BP Location FiO2 (%)   Oral Monitor Sitting Left arm --      Pain Score       Worst Possible Pain           Orthostatic Vital Signs  Vitals:    11/04/18 0941 11/04/18 1104 11/04/18 1126 11/04/18 1330   BP: 159/100 151/78 129/73 140/79   Pulse: 56 (!) 52 (!) 52 (!) 51   Patient Position - Orthostatic VS: Sitting  Lying Lying       Physical Exam   Constitutional: She is oriented to person, place, and time   She appears well-developed and well-nourished  She appears distressed  HENT:   Head: Normocephalic and atraumatic  Mouth/Throat: Oropharynx is clear and moist    Eyes: Conjunctivae and EOM are normal  Right eye exhibits no discharge  Left eye exhibits no discharge  Neck: Normal range of motion  Neck supple  Cardiovascular: Normal rate, regular rhythm and intact distal pulses  Pulmonary/Chest: Effort normal and breath sounds normal  No stridor  No respiratory distress  Abdominal: Soft  Bowel sounds are normal  There is tenderness (LLQ, suprapubic tenderness to palpation)  There is no rebound and no guarding  +L CVA tenderness   Musculoskeletal: Normal range of motion  She exhibits no edema or tenderness  Neurological: She is alert and oriented to person, place, and time  No sensory deficit  She exhibits normal muscle tone  Skin: Skin is warm and dry  Capillary refill takes less than 2 seconds  Nursing note and vitals reviewed  ED Medications  Medications   ondansetron (ZOFRAN) injection 4 mg (4 mg Intravenous Given 11/4/18 1108)   ketorolac (TORADOL) injection 15 mg (15 mg Intravenous Given 11/4/18 1108)   morphine (PF) 10 mg/mL injection 6 mg (6 mg Intravenous Given 11/4/18 1254)       Diagnostic Studies  Results Reviewed     Procedure Component Value Units Date/Time    POCT urinalysis dipstick [[id number]]  (Normal) Resulted:  11/04/18 1258    Lab Status:  Final result Specimen:  Urine from Urine, Other Updated:  11/04/18 1258     Color, UA       Urine Microscopic [[id number]]  (Abnormal) Collected:  11/04/18 1236    Lab Status:  Final result Specimen:  Urine from Urine, Clean Catch Updated:  11/04/18 1253     RBC, UA Innumerable (A) /hpf      WBC, UA       Field obscured, unable to enumerate (A)     /hpf     Epithelial Cells       Field obscured, unable to enumerate (A)     /hpf     Bacteria, UA       Field obscured, unable to enumerate (A)     /hpf    Urine culture [[id number]] Collected:  11/04/18 1236 Lab Status: In process Specimen:  Urine from Urine, Clean Catch Updated:  11/04/18 1253    UA w Reflex to Microscopic w Reflex to Culture [[id number]]  (Abnormal) Collected:  11/04/18 1236    Lab Status:  Final result Specimen:  Urine from Urine, Clean Catch Updated:  11/04/18 1251     Color, UA Red     Clarity, UA Turbid     Specific P[place], UA 1 020     pH, UA --     Leukocytes, UA Interference- unable to analyze (A)     Nitrite, UA Interference- unable to analyze     Protein, UA       Interference- unable to analyze (A)     mg/dl     Glucose, UA       Interference- unable to analyze     mg/dl     Ketones, UA       Interference- unable to analyze (A)     mg/dl     Urobilinogen, UA       Interference-unable to analyze     E U /dl     Bilirubin, UA Interference- unable to analyze (A)     Blood, UA Interference- unable to analyze (A)    POCT pregnancy, urine [[id number]]  (Normal) Resulted:  11/04/18 1221    Lab Status:  Final result Specimen:  Urine Updated:  11/04/18 1221     EXT PREG TEST UR (Ref: Negative) Negative    Comprehensive metabolic panel [[id number]]  (Abnormal) Collected:  11/04/18 1032    Lab Status:  Final result Specimen:  Blood from Arm, Right Updated:  11/04/18 1134     Sodium 134 (L) mmol/L      Potassium 4 6 mmol/L      Chloride 107 mmol/L      CO2 21 mmol/L      ANION GAP 6 mmol/L      BUN 21 mg/dL      Creatinine 0 92 mg/dL      Glucose 153 (H) mg/dL      Calcium 8 5 mg/dL      AST 29 U/L      ALT 22 U/L      Alkaline Phosphatase 95 U/L      Total Protein 7 0 g/dL      Albumin 3 4 (L) g/dL      Total Bilirubin 0 37 mg/dL      eGFR 79 ml/min/1 73sq m     Narrative:         National Kidney Disease Education Program recommendations are as follows:  GFR calculation is accurate only with a steady state creatinine  Chronic Kidney disease less than 60 ml/min/1 73 sq  meters  Kidney failure less than 15 ml/min/1 73 sq  meters      Lipase [[id number]]  (Abnormal) Collected:  11/04/18 1032    Lab Status: Final result Specimen:  Blood from Arm, Right Updated:  11/04/18 1134     Lipase 69 (L) u/L     CBC and differential [[id number]]  (Abnormal) Collected:  11/04/18 1032    Lab Status:  Final result Specimen:  Blood from Arm, Right Updated:  11/04/18 1039     WBC 15 26 (H) Thousand/uL      RBC 4 95 Million/uL      Hemoglobin 14 8 g/dL      Hematocrit 44 7 %      MCV 90 fL      MCH 29 9 pg      MCHC 33 1 g/dL      RDW 14 1 %      MPV 11 0 fL      Platelets 363 Thousands/uL      nRBC 0 /100 WBCs      Neutrophils Relative 76 (H) %      Immat GRANS % 0 %      Lymphocytes Relative 17 %      Monocytes Relative 5 %      Eosinophils Relative 1 %      Basophils Relative 1 %      Neutrophils Absolute 11 75 (H) Thousands/µL      Immature Grans Absolute 0 06 Thousand/uL      Lymphocytes Absolute 2 55 Thousands/µL      Monocytes Absolute 0 72 Thousand/µL      Eosinophils Absolute 0 11 Thousand/µL      Basophils Absolute 0 07 Thousands/µL                  CT renal stone study abdomen pelvis wo contrast   Final Result by Valery Villarreal MD (11/04 1341)         1  Mild left hydroureteronephrosis  This is secondary to a 6 mm calculus in the distal left ureter  2   Tiny nonobstructing right renal calculi  Workstation performed: [id number]               Procedures  Procedures      Phone Consults  ED Phone Contact    ED Course  ED Course as of Nov 04 1545   Sun Nov 04, 2018   1410 Pain improved for about 1 hr after receiving toradol but then worsening again  Given 6mg morphine with improvement and now wanting to leave  Pt adamant about wanting to be discharged  Discussed with pt that stone is 6mm and might not pass on its own  Also uncertain at this time if she has infection, but will hold off on antibiotics at this time as recommended by urology  Afebrile, micro on UA obscured by RBCs  Does have elevated WBC but may be due to stress response/pain  Strict return precautions discussed  MDM  Number of Diagnoses or Management Options  Ureteral stone with hydronephrosis:   Diagnosis management comments: 6mm ureteral stone with hydronephrosis on L  rx for norco for pain  nsaids at home  Strict return precautions    CritCare Time    Disposition  Final diagnoses:   Ureteral stone with hydronephrosis     Time reflects when diagnosis was documented in both MDM as applicable and the Disposition within this note     Time User Action Codes Description Comment    11/4/2018  2:07 PM Katelyn Denver Add [N13 2] Ureteral stone with hydronephrosis       ED Disposition     ED Disposition Condition Comment    Discharge  Walter Desai discharge to home/self care      Condition at discharge: Stable        Follow-up Information     Follow up With Specialties Details Why Contact Info Additional [address] Emergency Department Emergency Medicine  If symptoms worsen, As needed [address] [facility], [address], [address]  For Urology [facility] Call follow up kidney stone [address] [facility] [address] [facility] [id number]  [address] [place] [address] For Urology [facility], [address] [place], [place], [address]          Discharge Medication List as of 11/4/2018  2:09 PM      START taking these medications    Details   oxyCODONE-acetaminophen (PERCOCET) 5-325 mg per tablet Take 1 tablet by mouth every 6 (six) hours as needed for moderate pain or severe pain, Starting Sun 11/4/2018, Print         CONTINUE these medications which have NOT CHANGED    Details   albuterol (PROVENTIL HFA,VENTOLIN HFA) 90 mcg/act inhaler Inhale 2 puffs every 6 (six) hours as needed for wheezing, Historical Med      benzonatate (TESSALON PERLES) 100 mg capsule Take 1 capsule (100 mg total) by mouth 3 (three) times a day as needed for cough, Starting Fri 9/14/2018, Normal      buPROPion (WELLBUTRIN) 100 mg tablet Take 100 mg by mouth 2 (two) times a day, Historical Med      fluticasone-vilanterol (BREO ELLIPTA) 100-25 mcg/inh inhaler Inhale 1 puff daily Rinse mouth after use , Starting Wed 8/15/2018, Print      guaiFENesin (MUCINEX) 600 mg 12 hr tablet Take 1 tablet (600 mg total) by mouth every 12 (twelve) hours, Starting Thu 8/16/2018, Print      naproxen (NAPROSYN) 500 mg tablet Take by mouth, Historical Med      nicotine (NICODERM CQ) 14 mg/24hr TD 24 hr patch Place 1 patch on the skin daily, Starting Fri 8/17/2018, Print      predniSONE 10 mg tablet Take 4 tabs PO daily x3d, then 3 tabs PO daily x 3d, then 2 tabs PO daily x 3d, then 1 tab PO daily, Print      varenicline (CHANTIX SARAH) 0 5 MG X 11 & 1 MG X 42 tablet Take 1 mg by mouth 2 (two) times a day Take one 0 5mg tab by mouth 1x daily for 3 days, then increase to one 0 5mg tab 2x daily for 3 days, then increase to one 1mg tab 2x daily, Historical Med           No discharge procedures on file  ED Provider  Attending physically available and evaluated Aubrey Shannon I managed the patient along with the ED Attending      Electronically Signed by         Zhen Paz MD  11/04/18 5774

## 2018-11-04 NOTE — DISCHARGE INSTRUCTIONS
Ureteral Stones   WHAT YOU NEED TO KNOW:   A ureteral stone is a stone that forms in the kidney and moves down the ureter and gets stuck there  The ureter is the tube that takes urine from the kidney to the bladder  Stones can form in the urinary system when your urine has high levels of minerals and salts  Urinary stones can be made of uric acid, calcium, phosphate, or oxalate crystals  DISCHARGE INSTRUCTIONS:   Return to the emergency department if:   · You have severe pain that does not improve, even after you take medicine  · You have vomiting that is not relieved by medicine  · You develop a fever  Contact your healthcare provider if:   · You develop a fever  · You have any questions or concerns about your condition or care  Follow up with your healthcare provider as directed: You may need to return for more tests  Write down your questions so you remember to ask them during your visits  Medicines: You may need any of the following:  · NSAIDs , such as ibuprofen, help decrease swelling, pain, and fever  This medicine is available with or without a doctor's order  NSAIDs can cause stomach bleeding or kidney problems in certain people  If you take blood thinner medicine, always ask your healthcare provider if NSAIDs are safe for you  Always read the medicine label and follow directions  · Prescription pain medicine  may help decrease pain or help your ureteral stone pass  Do not wait until the pain is severe before you take pain medicine  · Nausea medicine  may help calm your stomach and prevent vomiting  · Take your medicine as directed  Contact your healthcare provider if you think your medicine is not helping or if you have side effects  Tell him or her if you are allergic to any medicine  Keep a list of the medicines, vitamins, and herbs you take  Include the amounts, and when and why you take them  Bring the list or the pill bottles to follow-up visits   Carry your medicine list with you in case of an emergency  Self-care:   · Drink plenty of liquids  Your healthcare provider may tell you to drink at least 8 to 12 (eight-ounce) cups of liquids each day  This helps flush out the ureteral stones when you urinate  Water is the best liquid to drink  · Strain your urine every time you go to the bathroom  Urinate through a strainer or a piece of thin cloth to catch the stones  Take the stones to your healthcare provider so they can be sent to the lab for tests  This will help your healthcare providers plan the best treatment for you  · Ask your healthcare provider about any nutrition changes you need to make  You may need to limit certain foods such as foods high in sodium (salt), certain protein foods, or foods high in oxalate  After you pass your ureteral stone: Once you have passed your ureteral stone, you may need to do a 24-hour urine test  You may need to save all of your urine for 24 hours  Each time you go to the bathroom, you will urinate into a container  Then you will pour your urine into a larger container that is kept cold  You may be told to write down the time and amount of urine you passed  At the end of 24 hours, the urine is sent to a lab for tests  Results from the test will help your healthcare provider plan ways to prevent more stones from forming  © 2017 [address] [employer] Information is for End User's use only and may not be sold, redistributed or otherwise used for commercial purposes  All illustrations and images included in CareNotes® are the copyrighted property of [employer]  or [employer]  The above information is an  only  It is not intended as medical advice for individual conditions or treatments  Talk to your doctor, nurse or pharmacist before following any medical regimen to see if it is safe and effective for you      Hydronephrosis   WHAT YOU NEED TO KNOW:   Hydronephrosis is swelling in one or both kidneys caused by urine buildup  Urine normally flows from the kidneys to the bladder through tubes called ureters  A blockage in the ureters can prevent urine from flowing properly  Urine flow may also be prevented or slowed if your kidneys do not work correctly  Urine flows back into your urinary tract  Pressure builds up in the kidney and causes swelling  DISCHARGE INSTRUCTIONS:   Follow up with your healthcare provider or specialist as directed: You may need to be referred to a gynecologist, oncologist, or urologist for more tests and treatment  Write down your questions so you remember to ask them during your visits  Contact your healthcare provider if:   · Your abdomen feels full  · You have a change in how much or how often you urinate  · You urinate more times at night and in larger amounts than during the day  · You have mild lower back pain or pain on one side when you urinate  · You have questions or concerns about your condition or care  Return to the emergency department if:   · You have severe, stabbing back pain  · You have blood in your urine  · You cannot urinate, or you urinate very little  © 2017 [address] [facility] Information is for End User's use only and may not be sold, redistributed or otherwise used for commercial purposes  All illustrations and images included in CareNotes® are the copyrighted property of A D [employer] M [employer]  or [employer]  The above information is an  only  It is not intended as medical advice for individual conditions or treatments  Talk to your doctor, nurse or pharmacist before following any medical regimen to see if it is safe and effective for you

## 2018-11-05 LAB — BACTERIA UR CULT: NORMAL

## 2018-11-25 NOTE — ED ATTENDING ATTESTATION
Tasha Collado MD, saw and evaluated the patient  I have discussed the patient with the resident/non-physician practitioner and agree with the resident's/non-physician practitioner's findings, Plan of Care, and MDM as documented in the resident's/non-physician practitioner's note, except where noted  All available labs and Radiology studies were reviewed  At this point I agree with the current assessment done in the Emergency Department  I have conducted an independent evaluation of this patient a history and physical is as follows:    Patient presents with 1 day of left-sided flank pain that radiates to the groin  Patient states the symptoms are similar to prior kidney stones but that current episode is worse  Patient denies dysuria  No additional complaints  Exam: AAOx3, mild distress due to pain, S/NT/ND, left lower quadrant and left flank tenderness to palpation  A/P:  Flank pain  Concern for kidney stone  Will check urine to evaluate for infection and will check CT scan to evaluate for kidney stone      Critical Care Time  CritCare Time    Procedures

## 2018-12-12 DIAGNOSIS — J45.20 MILD INTERMITTENT ASTHMA WITHOUT COMPLICATION: Primary | ICD-10-CM

## 2018-12-12 RX ORDER — FLUTICASONE FUROATE AND VILANTEROL 100; 25 UG/1; UG/1
1 POWDER RESPIRATORY (INHALATION) DAILY
Qty: 2 INHALER | Refills: 0 | Status: SHIPPED | COMMUNITY
Start: 2018-12-12 | End: 2020-03-18 | Stop reason: CLARIF

## 2020-02-06 ENCOUNTER — TRANSCRIBE ORDERS (OUTPATIENT)
Dept: ADMINISTRATIVE | Facility: HOSPITAL | Age: 40
End: 2020-02-06

## 2020-02-06 DIAGNOSIS — R10.2 PELVIC PAIN IN FEMALE: Primary | ICD-10-CM

## 2020-02-08 ENCOUNTER — HOSPITAL ENCOUNTER (OUTPATIENT)
Dept: RADIOLOGY | Facility: HOSPITAL | Age: 40
Discharge: HOME | End: 2020-02-08
Attending: OBSTETRICS & GYNECOLOGY
Payer: COMMERCIAL

## 2020-02-08 DIAGNOSIS — R10.2 PELVIC PAIN IN FEMALE: ICD-10-CM

## 2020-02-08 PROCEDURE — 76856 US EXAM PELVIC COMPLETE: CPT

## 2020-02-08 PROCEDURE — 76830 TRANSVAGINAL US NON-OB: CPT

## 2020-03-02 ENCOUNTER — TELEPHONE (OUTPATIENT)
Dept: OBGYN CLINIC | Facility: CLINIC | Age: 40
End: 2020-03-02

## 2020-03-02 NOTE — TELEPHONE ENCOUNTER
Patient called stated insurance denied prior authorization for orlissa  Pt stated she has blood clots the size of quarters , bleeding is not heavy  Motrin 800 mg is not helping      Would like to know next step, would like her to schedule appt to discuss or proceed with surgery as discuss at last visit

## 2020-03-03 NOTE — TELEPHONE ENCOUNTER
She should qualify for Orilissa  Was a prior authorization placed for her? If we absolutely cannot get this medication and she should follow up to discuss further plan

## 2020-03-18 ENCOUNTER — OFFICE VISIT (OUTPATIENT)
Dept: OBGYN CLINIC | Facility: CLINIC | Age: 40
End: 2020-03-18
Payer: COMMERCIAL

## 2020-03-18 VITALS
BODY MASS INDEX: 40.98 KG/M2 | HEIGHT: 65 IN | WEIGHT: 246 LBS | DIASTOLIC BLOOD PRESSURE: 70 MMHG | SYSTOLIC BLOOD PRESSURE: 100 MMHG

## 2020-03-18 DIAGNOSIS — N80.9 ENDOMETRIOSIS: Primary | ICD-10-CM

## 2020-03-18 PROCEDURE — 99213 OFFICE O/P EST LOW 20 MIN: CPT | Performed by: OBSTETRICS & GYNECOLOGY

## 2020-03-18 NOTE — PATIENT INSTRUCTIONS
Endometriosis   AMBULATORY CARE:   Endometriosis  is a condition in which tissue that is normally only in your uterus grows outside of the uterus  Endometriosis causes tissue that should be shed during a monthly period to grow on your ovaries, fallopian tubes, bladder, or other organs  Organs and tissue may stick together and cause inflammation and pain  Common symptoms include the following:   · Abdominal pain or nausea and vomiting before or during your period    · Painful periods     · Feeling full or bloated    · Dizziness or fatigue    · Heavy periods, or vaginal bleeding at times other than during your monthly period    · Infertility (being unable to get pregnant)    · Lower back pain or painful bowel movements during your monthly periods    · Pain during or after sex    · Pain when you urinate  Seek care immediately if:   · You have severe pain that does not go away after you take pain medicine  Contact your healthcare provider if:   · Your symptoms return after treatment  · You have heavy or unusual vaginal bleeding  · You see blood in your urine or bowel movement  · You have questions or concerns about your condition or care  Medicines:   · Hormones  may help shrink endometrial tissue and decrease pain and inflammation  You may be given birth control pills, androgen hormones, or medicine that makes your body produce less of certain hormones  · Acetaminophen  decreases pain and is available without a doctor's order  Ask how much to take and how often to take it  Follow directions  Acetaminophen can cause liver damage if not taken correctly  · NSAIDs , such as ibuprofen, help decrease swelling, pain, and fever  This medicine is available with or without a doctor's order  NSAIDs can cause stomach bleeding or kidney problems in certain people  If you take blood thinner medicine, always ask your healthcare provider if NSAIDs are safe for you   Always read the medicine label and follow directions  · Take your medicine as directed  Contact your healthcare provider if you think your medicine is not helping or if you have side effects  Tell him or her if you are allergic to any medicine  Keep a list of the medicines, vitamins, and herbs you take  Include the amounts, and when and why you take them  Bring the list or the pill bottles to follow-up visits  Carry your medicine list with you in case of an emergency  Self-care:   · Apply heat  on your abdomen for 20 to 30 minutes every 2 hours for as many days as directed  Heat helps decrease pain and muscle spasms  · Exercise regularly  to help reduce symptoms, such as pain  Ask about the best exercise plan for you  Follow up with your healthcare provider as directed:  Write down your questions so you remember to ask them during your visits  © 2017 [employer] [employer] Information is for End User's use only and may not be sold, redistributed or otherwise used for commercial purposes  All illustrations and images included in CareNotes® are the copyrighted property of [employer]  or [employer]  The above information is an  only  It is not intended as medical advice for individual conditions or treatments  Talk to your doctor, nurse or pharmacist before following any medical regimen to see if it is safe and effective for you

## 2020-03-18 NOTE — PROGRESS NOTES
Assessment/Plan:    50 minutes spent face-to-face with patient discussing her current issue, greater than 50% of time was designated towards care plan management  Patient is a 28-year-old female who has known endometriosis diagnosed in the past by a physician at [facility]  She also has irregular painful menses  She has chronic intermittent pain  She has painful intercourse  She had a workup in the past for abnormal bleeding she had a normal endometrial biopsy  She has no improvement with nonsteroidal anti-inflammatory medications  She cannot take oral contraceptives, she is presently a smoker  Patient recently got declined for Tracy Clink  Recommend trial of Depo Lupron  Risks versus benefits discussed with patient  Possible add back progesterone therapy  All questions answered  Patient left satisfied  There are no diagnoses linked to this encounter  Subjective:     Patient ID: Adry Arteaga is a 44 y o  female  HPI 28-year-old female with longstanding history of pelvic pain and known endometriosis presents to discuss treatment options  She patient was declined Tracy Clink  She has no improvement with NSAIDs      Review of Systems

## 2020-03-25 ENCOUNTER — TELEPHONE (OUTPATIENT)
Dept: OBGYN CLINIC | Facility: CLINIC | Age: 40
End: 2020-03-25

## 2020-03-25 NOTE — TELEPHONE ENCOUNTER
Insurance approved Orilissa 150 mg- valid from 03/24/20-3/24/22     Case # [id number]    Pt notified to start medication

## 2020-10-02 ENCOUNTER — APPOINTMENT (OUTPATIENT)
Dept: LAB | Facility: HOSPITAL | Age: 40
End: 2020-10-02
Payer: COMMERCIAL

## 2020-10-02 ENCOUNTER — TRANSCRIBE ORDERS (OUTPATIENT)
Dept: LAB | Facility: HOSPITAL | Age: 40
End: 2020-10-02

## 2020-10-02 DIAGNOSIS — E78.00 PURE HYPERCHOLESTEROLEMIA: Primary | ICD-10-CM

## 2020-10-02 DIAGNOSIS — J45.20 MILD INTERMITTENT ASTHMATIC BRONCHITIS WITHOUT COMPLICATION: ICD-10-CM

## 2020-10-02 DIAGNOSIS — E78.00 PURE HYPERCHOLESTEROLEMIA: ICD-10-CM

## 2020-10-02 LAB
ALBUMIN SERPL BCP-MCNC: 3.6 G/DL (ref 3.5–5)
ALP SERPL-CCNC: 99 U/L (ref 46–116)
ALT SERPL W P-5'-P-CCNC: 17 U/L (ref 12–78)
ANION GAP SERPL CALCULATED.3IONS-SCNC: -1 MMOL/L (ref 4–13)
AST SERPL W P-5'-P-CCNC: 8 U/L (ref 5–45)
BASOPHILS # BLD AUTO: 0.08 THOUSANDS/ΜL (ref 0–0.1)
BASOPHILS NFR BLD AUTO: 1 % (ref 0–1)
BILIRUB SERPL-MCNC: 0.15 MG/DL (ref 0.2–1)
BUN SERPL-MCNC: 20 MG/DL (ref 5–25)
CALCIUM SERPL-MCNC: 9.1 MG/DL (ref 8.3–10.1)
CHLORIDE SERPL-SCNC: 109 MMOL/L (ref 100–108)
CHOLEST SERPL-MCNC: 189 MG/DL (ref 50–200)
CO2 SERPL-SCNC: 33 MMOL/L (ref 21–32)
CREAT SERPL-MCNC: 0.85 MG/DL (ref 0.6–1.3)
EOSINOPHIL # BLD AUTO: 0.2 THOUSAND/ΜL (ref 0–0.61)
EOSINOPHIL NFR BLD AUTO: 2 % (ref 0–6)
ERYTHROCYTE [DISTWIDTH] IN BLOOD BY AUTOMATED COUNT: 13.9 % (ref 11.6–15.1)
GFR SERPL CREATININE-BSD FRML MDRD: 86 ML/MIN/1.73SQ M
GLUCOSE P FAST SERPL-MCNC: 72 MG/DL (ref 65–99)
HCT VFR BLD AUTO: 45.4 % (ref 34.8–46.1)
HDLC SERPL-MCNC: 38 MG/DL
HGB BLD-MCNC: 14.9 G/DL (ref 11.5–15.4)
IMM GRANULOCYTES # BLD AUTO: 0.03 THOUSAND/UL (ref 0–0.2)
IMM GRANULOCYTES NFR BLD AUTO: 0 % (ref 0–2)
LDLC SERPL CALC-MCNC: 126 MG/DL (ref 0–100)
LYMPHOCYTES # BLD AUTO: 3.45 THOUSANDS/ΜL (ref 0.6–4.47)
LYMPHOCYTES NFR BLD AUTO: 35 % (ref 14–44)
MCH RBC QN AUTO: 30 PG (ref 26.8–34.3)
MCHC RBC AUTO-ENTMCNC: 32.8 G/DL (ref 31.4–37.4)
MCV RBC AUTO: 91 FL (ref 82–98)
MONOCYTES # BLD AUTO: 0.78 THOUSAND/ΜL (ref 0.17–1.22)
MONOCYTES NFR BLD AUTO: 8 % (ref 4–12)
NEUTROPHILS # BLD AUTO: 5.4 THOUSANDS/ΜL (ref 1.85–7.62)
NEUTS SEG NFR BLD AUTO: 54 % (ref 43–75)
NONHDLC SERPL-MCNC: 151 MG/DL
NRBC BLD AUTO-RTO: 0 /100 WBCS
PLATELET # BLD AUTO: 272 THOUSANDS/UL (ref 149–390)
PMV BLD AUTO: 11 FL (ref 8.9–12.7)
POTASSIUM SERPL-SCNC: 4.6 MMOL/L (ref 3.5–5.3)
PROT SERPL-MCNC: 7.1 G/DL (ref 6.4–8.2)
RBC # BLD AUTO: 4.97 MILLION/UL (ref 3.81–5.12)
SODIUM SERPL-SCNC: 141 MMOL/L (ref 136–145)
TRIGL SERPL-MCNC: 126 MG/DL
TSH SERPL DL<=0.05 MIU/L-ACNC: 1.2 UIU/ML (ref 0.36–3.74)
WBC # BLD AUTO: 9.94 THOUSAND/UL (ref 4.31–10.16)

## 2020-10-02 PROCEDURE — 80053 COMPREHEN METABOLIC PANEL: CPT

## 2020-10-02 PROCEDURE — 80061 LIPID PANEL: CPT

## 2020-10-02 PROCEDURE — 84443 ASSAY THYROID STIM HORMONE: CPT

## 2020-10-02 PROCEDURE — 36415 COLL VENOUS BLD VENIPUNCTURE: CPT

## 2020-10-02 PROCEDURE — 85025 COMPLETE CBC W/AUTO DIFF WBC: CPT

## 2021-01-18 ENCOUNTER — IMMUNIZATIONS (OUTPATIENT)
Dept: FAMILY MEDICINE CLINIC | Facility: HOSPITAL | Age: 41
End: 2021-01-18

## 2021-01-18 DIAGNOSIS — Z23 ENCOUNTER FOR IMMUNIZATION: Primary | ICD-10-CM

## 2021-01-18 PROCEDURE — 0001A SARS-COV-2 / COVID-19 MRNA VACCINE (PFIZER-BIONTECH) 30 MCG: CPT

## 2021-01-18 PROCEDURE — 91300 SARS-COV-2 / COVID-19 MRNA VACCINE (PFIZER-BIONTECH) 30 MCG: CPT

## 2021-02-07 ENCOUNTER — IMMUNIZATIONS (OUTPATIENT)
Dept: FAMILY MEDICINE CLINIC | Facility: HOSPITAL | Age: 41
End: 2021-02-07

## 2021-02-07 DIAGNOSIS — Z23 ENCOUNTER FOR IMMUNIZATION: Primary | ICD-10-CM

## 2021-02-07 PROCEDURE — 91300 SARS-COV-2 / COVID-19 MRNA VACCINE (PFIZER-BIONTECH) 30 MCG: CPT

## 2021-02-07 PROCEDURE — 0002A SARS-COV-2 / COVID-19 MRNA VACCINE (PFIZER-BIONTECH) 30 MCG: CPT

## 2021-02-08 ENCOUNTER — LAB (OUTPATIENT)
Dept: LAB | Facility: HOSPITAL | Age: 41
End: 2021-02-08
Payer: COMMERCIAL

## 2021-02-08 DIAGNOSIS — R53.83 FATIGUE, UNSPECIFIED TYPE: ICD-10-CM

## 2021-02-08 DIAGNOSIS — D64.9 ANEMIA, UNSPECIFIED TYPE: ICD-10-CM

## 2021-02-08 LAB
ERYTHROCYTE [DISTWIDTH] IN BLOOD BY AUTOMATED COUNT: 14 % (ref 11.6–15.1)
HCT VFR BLD AUTO: 43.6 % (ref 34.8–46.1)
HGB BLD-MCNC: 14.3 G/DL (ref 11.5–15.4)
MCH RBC QN AUTO: 29.6 PG (ref 26.8–34.3)
MCHC RBC AUTO-ENTMCNC: 32.8 G/DL (ref 31.4–37.4)
MCV RBC AUTO: 90 FL (ref 82–98)
PLATELET # BLD AUTO: 252 THOUSANDS/UL (ref 149–390)
PMV BLD AUTO: 10.8 FL (ref 8.9–12.7)
RBC # BLD AUTO: 4.83 MILLION/UL (ref 3.81–5.12)
WBC # BLD AUTO: 8.47 THOUSAND/UL (ref 4.31–10.16)

## 2021-02-08 PROCEDURE — 85027 COMPLETE CBC AUTOMATED: CPT

## 2021-02-08 PROCEDURE — 36415 COLL VENOUS BLD VENIPUNCTURE: CPT

## 2021-03-22 ENCOUNTER — TELEPHONE (OUTPATIENT)
Dept: OBGYN CLINIC | Facility: CLINIC | Age: 41
End: 2021-03-22

## 2021-03-24 DIAGNOSIS — N80.3 ENDOMETRIOSIS OF PELVIC PERITONEUM: Primary | ICD-10-CM

## 2021-03-24 RX ORDER — ELAGOLIX 150 MG/1
1 TABLET, FILM COATED ORAL DAILY
COMMUNITY
Start: 2020-12-23 | End: 2021-03-24 | Stop reason: SDUPTHER

## 2021-03-24 RX ORDER — ELAGOLIX 150 MG/1
1 TABLET, FILM COATED ORAL DAILY
Qty: 90 TABLET | Refills: 1 | Status: SHIPPED | OUTPATIENT
Start: 2021-03-24

## 2021-08-02 ENCOUNTER — TELEPHONE (OUTPATIENT)
Dept: OBGYN CLINIC | Facility: CLINIC | Age: 41
End: 2021-08-02

## 2021-08-08 ENCOUNTER — APPOINTMENT (EMERGENCY)
Dept: RADIOLOGY | Facility: HOSPITAL | Age: 41
End: 2021-08-08
Payer: COMMERCIAL

## 2021-08-08 ENCOUNTER — HOSPITAL ENCOUNTER (EMERGENCY)
Facility: HOSPITAL | Age: 41
Discharge: HOME | End: 2021-08-08
Attending: EMERGENCY MEDICINE
Payer: COMMERCIAL

## 2021-08-08 VITALS
HEIGHT: 65 IN | TEMPERATURE: 98.6 F | BODY MASS INDEX: 40.04 KG/M2 | RESPIRATION RATE: 20 BRPM | OXYGEN SATURATION: 99 % | DIASTOLIC BLOOD PRESSURE: 73 MMHG | WEIGHT: 240.3 LBS | SYSTOLIC BLOOD PRESSURE: 131 MMHG | HEART RATE: 86 BPM

## 2021-08-08 DIAGNOSIS — S52.123A: Primary | ICD-10-CM

## 2021-08-08 PROCEDURE — 99284 EMERGENCY DEPT VISIT MOD MDM: CPT | Performed by: EMERGENCY MEDICINE

## 2021-08-08 PROCEDURE — 99283 EMERGENCY DEPT VISIT LOW MDM: CPT

## 2021-08-08 PROCEDURE — 73090 X-RAY EXAM OF FOREARM: CPT

## 2021-08-08 PROCEDURE — 73110 X-RAY EXAM OF WRIST: CPT

## 2021-08-08 RX ORDER — BUPROPION HYDROCHLORIDE 150 MG/1
150 TABLET ORAL DAILY
COMMUNITY

## 2021-08-08 NOTE — ED ATTENDING ATTESTATION
8/8/2021  IPau MD, saw and evaluated the patient  I have discussed the patient with the resident/non-physician practitioner and agree with the resident's/non-physician practitioner's findings, Plan of Care, and MDM as documented in the resident's/non-physician practitioner's note, except where noted  All available labs and Radiology studies were reviewed  I was present for key portions of any procedure(s) performed by the resident/non-physician practitioner and I was immediately available to provide assistance  At this point I agree with the current assessment done in the Emergency Department    I have conducted an independent evaluation of this patient a history and physical is as follows:  Pt tripped and fell onto outstretched hands Pt started with pain up r forearm into elbow Pt co pain in elbow and base of r thumb PE: alert unable to supinate heart reg lungs clear abd R elbow tender over posterior radial area tender base of r thumb unable to supinate good nv hand from MDM: will do xrays  ED Course         Critical Care Time  Procedures

## 2021-08-08 NOTE — DISCHARGE INSTRUCTIONS
Thank you for coming to the Emergency Department today for care of your elbow fracture  We have requested an appointment for you to be seen by the orthopedic surgeons for further evaluation  They should give you a call in the next 1-2 days  If they do not, please call the number given to schedule an appt  Please return to the Emergency Department if you experience tense swelling of your forearm, loss of sensation, or any other concerning symptoms

## 2021-08-08 NOTE — Clinical Note
Sachi Tobin was seen and treated in our emergency department on 8/8/2021  No work until cleared by Family Doctor/Orthopedics        Diagnosis: radial head fracture    Jolly    She may return on this date: If you have any questions or concerns, please don't hesitate to call        Maria Alejandra Newell MD    ______________________________           _______________          _______________  Hospital Representative                              Date                                Time

## 2021-08-08 NOTE — ED PROVIDER NOTES
History  Chief Complaint   Patient presents with    Fall     PT "I was walking when I didnt see the hole in the sidewalk and I tripped  I didnt want to eat the sidewalk so I put my arm out" PT denies CP and SOB    Wrist Injury     39 y o F p/w fall on outstretched arms  She was crossing the street when she tripped and fell palms down on outstretched arms leading to R elbow > wrist pain  She felt an immediate shooting pain up her forearm into her elbow  She noted immediate refusal to bend/extend at the elbow or supinate/pronate her hand  Her pain is mild in quality at this time, increases to 10/10 with movement  She has taken tylenol and notes at this time it has resolved her pain  Prior to Admission Medications   Prescriptions Last Dose Informant Patient Reported? Taking? Orilissa 150 MG TABS   No No   Sig: Take 1 tablet by mouth daily   buPROPion (WELLBUTRIN XL) 150 mg 24 hr tablet   Yes Yes   Sig: Take 150 mg by mouth daily      Facility-Administered Medications: None       Past Medical History:   Diagnosis Date    Abnormal Papanicolaou smear of cervix     Asthma     Endometriosis 02/06/2020    Kidney stones     Left breast lump     Papanicolaou smear for cervical cancer screening 2016       Past Surgical History:   Procedure Laterality Date    CHOLECYSTECTOMY      TUBAL LIGATION         Family History   Problem Relation Age of Onset    Diabetes Mother     COPD Mother     Other Mother         Multiple breast cysts     Diabetes Father     Heart disease Father     Heart failure Father     Breast cancer Maternal Aunt      I have reviewed and agree with the history as documented      E-Cigarette/Vaping    E-Cigarette Use Never User      E-Cigarette/Vaping Substances    Nicotine No     THC No     CBD No     Flavoring No     Other No     Unknown No      Social History     Tobacco Use    Smoking status: Current Every Day Smoker     Packs/day: 1 00     Years: 29 00     Pack years: 29 00 Types: Cigarettes    Smokeless tobacco: Current User    Tobacco comment: No cigarettes since 08/10/2018, was down to half a pack a day   Vaping Use    Vaping Use: Never used   Substance Use Topics    Alcohol use: No    Drug use: Yes     Types: Marijuana     Comment: last used: 08/07/21        Review of Systems   Constitutional: Negative for chills and fever  Musculoskeletal:        Arm pain   Skin: Negative for wound  All other systems reviewed and are negative  Physical Exam  ED Triage Vitals [08/08/21 1528]   Temperature Pulse Respirations Blood Pressure SpO2   98 6 °F (37 °C) 86 20 131/73 99 %      Temp Source Heart Rate Source Patient Position - Orthostatic VS BP Location FiO2 (%)   Oral Monitor Sitting Left arm --      Pain Score       8             Orthostatic Vital Signs  Vitals:    08/08/21 1528   BP: 131/73   Pulse: 86   Patient Position - Orthostatic VS: Sitting       Physical Exam  Vitals reviewed  Constitutional:       General: She is not in acute distress  Appearance: Normal appearance  She is not toxic-appearing  HENT:      Head: Normocephalic  Mouth/Throat:      Pharynx: Oropharynx is clear  Eyes:      Conjunctiva/sclera: Conjunctivae normal    Cardiovascular:      Rate and Rhythm: Normal rate  Pulmonary:      Effort: Pulmonary effort is normal    Abdominal:      General: Abdomen is flat  Musculoskeletal:      Comments: No obvious external deformity  Refusal to supinate R forearm, fully extend/flex at elbow, fully extend/flex at wrist 2/2 pain   strength 5/5  Sensation intact  Radial pulse intact  Tender to palpation diffusely at wrist and medial epicondyle   Skin:     General: Skin is warm  Capillary Refill: Capillary refill takes less than 2 seconds  Neurological:      General: No focal deficit present  Mental Status: She is alert     Psychiatric:         Mood and Affect: Mood normal          ED Medications  Medications - No data to display    Diagnostic Studies  Results Reviewed     None                 XR wrist 3+ views RIGHT    (Results Pending)   XR forearm 2 views RIGHT    (Results Pending)         Procedures  Procedures      ED Course                                       MDM  Number of Diagnoses or Management Options  Fracture of radial head, closed  Diagnosis management comments: 39 y o F w/ concern for fracture following fall  Will evaluate wrist, forearm for potential fracture  Xray forearm showing radial head fracture  Patient splinted in ED and provided Ortho f/u  Patient agreeable with plan, does not want pain control  Discharged w/ work note and amb referral orthopedics  Disposition  Final diagnoses:   Fracture of radial head, closed     Time reflects when diagnosis was documented in both MDM as applicable and the Disposition within this note     Time User Action Codes Description Comment    8/8/2021  4:49 PM Ariel Allen [K01 [id number]] Fracture of radial head, closed       ED Disposition     ED Disposition Condition Date/Time Comment    Discharge Stable Sun Aug 8, 2021  4:49 PM Ottoniel Grover discharge to home/self care  Follow-up Information     Follow up With Specialties Details Why Contact Info Additional [address] [facility] In 2 days evaluation for care of radial head fracture [address] [phone]  [address], [address], [place], [address]          Patient's Medications   Discharge Prescriptions    No medications on file         PDMP Review     None           ED Provider  Attending physically available and evaluated Ottoniel Grover I managed the patient along with the ED Attending      Electronically Signed by         Marcy Iyer MD  08/08/21 6896

## 2021-08-12 ENCOUNTER — HOSPITAL ENCOUNTER (OUTPATIENT)
Dept: RADIOLOGY | Facility: HOSPITAL | Age: 41
Discharge: HOME | End: 2021-08-12
Payer: COMMERCIAL

## 2021-08-12 ENCOUNTER — OFFICE VISIT (OUTPATIENT)
Dept: OBGYN CLINIC | Facility: HOSPITAL | Age: 41
End: 2021-08-12
Payer: COMMERCIAL

## 2021-08-12 VITALS
WEIGHT: 245.2 LBS | HEART RATE: 55 BPM | SYSTOLIC BLOOD PRESSURE: 121 MMHG | HEIGHT: 66 IN | BODY MASS INDEX: 39.41 KG/M2 | DIASTOLIC BLOOD PRESSURE: 83 MMHG

## 2021-08-12 DIAGNOSIS — M25.521 RIGHT ELBOW PAIN: Primary | ICD-10-CM

## 2021-08-12 DIAGNOSIS — S52.123A: ICD-10-CM

## 2021-08-12 DIAGNOSIS — S02.631A: ICD-10-CM

## 2021-08-12 DIAGNOSIS — S42.401A CLOSED FRACTURE OF RIGHT ELBOW, INITIAL ENCOUNTER: ICD-10-CM

## 2021-08-12 PROCEDURE — 99203 OFFICE O/P NEW LOW 30 MIN: CPT | Performed by: PHYSICIAN ASSISTANT

## 2021-08-12 PROCEDURE — 73080 X-RAY EXAM OF ELBOW: CPT

## 2021-08-12 NOTE — PROGRESS NOTES
Assessment/Plan   Diagnoses and all orders for this visit:      Fracture of radial head, closed    Fracture of coronoid, type I    - Concern for LCL rupture  - MRI right elbow  - Continue splint, sling  - Follow up with Dr Tom Anaya / Mery Fischer / Darnell Torres after MRI          Subjective   Patient ID: Don Jauregui is a 39 y o  female  Vitals:    08/12/21 1409   BP: 121/83   Pulse: 54     40yo female comes in for an evaluation of her right elbow  She was injured on 8-8-21 when she tripped and fell onto her outstretched arm while crossing the street  Xrays done in the ER demonstrated a radial neck fracture  The pain is sharp in character, moderate in severity, pain does not radiate and is not associated with numbness          The following portions of the patient's history were reviewed and updated as appropriate: allergies, current medications, past family history, past medical history, past social history, past surgical history and problem list     Review of Systems  Ortho Exam  Past Medical History:   Diagnosis Date    Abnormal Papanicolaou smear of cervix     Asthma     Endometriosis 02/06/2020    Kidney stones     Left breast lump     Papanicolaou smear for cervical cancer screening 2016     Past Surgical History:   Procedure Laterality Date    CHOLECYSTECTOMY      TUBAL LIGATION       Family History   Problem Relation Age of Onset    Diabetes Mother     COPD Mother     Other Mother         Multiple breast cysts     Diabetes Father     Heart disease Father     Heart failure Father     Breast cancer Maternal Aunt      Social History     Occupational History    Not on file   Tobacco Use    Smoking status: Current Every Day Smoker     Packs/day: 1 00     Years: 29 00     Pack years: 29 00     Types: Cigarettes    Smokeless tobacco: Current User    Tobacco comment: No cigarettes since 08/10/2018, was down to half a pack a day   Vaping Use    Vaping Use: Never used   Substance and Sexual Activity    Alcohol use: No    Drug use: Yes     Types: Marijuana     Comment: last used: 08/07/21    Sexual activity: Not Currently     Birth control/protection: Female Sterilization       Review of Systems   Constitutional: Negative  HENT: Negative  Eyes: Negative  Respiratory: Negative  Cardiovascular: Negative  Gastrointestinal: Negative  Endocrine: Negative  Genitourinary: Negative  Musculoskeletal: As below      Allergic/Immunologic: Negative  Neurological: Negative  Hematological: Negative  Psychiatric/Behavioral: Negative  Objective   Physical Exam      I have personally reviewed pertinent films in PACS and my interpretation is radial head and coronoid fractures  · Constitutional: Awake, Alert, Oriented  · Eyes: EOMI  · Psych: Mood and affect appropriate  · Heart: regular rate and rhythm  · Lungs: No audible wheezing  · Abdomen: soft  · Lymph: no lymphedema       right Elbow:  - Appearance   Swelling, no discoloration, no deformity, no ecchymosis and no erythema  - Palpation   + tenderness generalized about the anterior, medial, and lateral elbow  - ROM   Not tested due to fracture status  - Motor   limited by pain  - Special Tests   Concern for lateral laxity but with ++ guarding       Median/ulnar/radial nerve motor intact  - NVI distally

## 2021-08-12 NOTE — LETTER
August 12, 2021     Patient: Flaco Gotti   YOB: 1980   Date of Visit: 8/12/2021       To Whom it May Concern:    Flaco Gotti is under my professional care  She was seen in my office on 8/12/2021  She may return to work with limitations : no use of the right arm       If you have any questions or concerns, please don't hesitate to call           Sincerely,          Claire Morataya PA-C        CC: Flaco Gotti

## 2021-08-15 ENCOUNTER — DOCUMENTATION (OUTPATIENT)
Dept: EMERGENCY DEPT | Facility: HOSPITAL | Age: 41
End: 2021-08-15

## 2021-08-16 NOTE — ED NOTES
Procedure 08/08/21  Posterior splinting performed  Arm held in partially flexed angle, wrapped with ACE  Posterior splint with orthoglass from mid-triceps to palm     Patient tolerated procedure  Patient notified to wear until f/u with orthopedics     Amalia Ly MD  08/15/21 2054

## 2021-08-19 ENCOUNTER — HOSPITAL ENCOUNTER (OUTPATIENT)
Dept: MRI IMAGING | Facility: HOSPITAL | Age: 41
Discharge: HOME | End: 2021-08-19
Payer: COMMERCIAL

## 2021-08-19 DIAGNOSIS — M25.521 RIGHT ELBOW PAIN: ICD-10-CM

## 2021-08-19 DIAGNOSIS — S52.123A: ICD-10-CM

## 2021-08-19 DIAGNOSIS — S02.631A: ICD-10-CM

## 2021-08-19 PROCEDURE — G1004 CDSM NDSC: HCPCS

## 2021-08-19 PROCEDURE — 73221 MRI JOINT UPR EXTREM W/O DYE: CPT

## 2021-08-19 NOTE — ASSESSMENT & PLAN NOTE
The patient is a 85y Female complaining of fall. · Discussed diet, exercise, and weight loss   Patient reports that she is actively working towards this as outpatient

## 2021-08-23 ENCOUNTER — OFFICE VISIT (OUTPATIENT)
Dept: OBGYN CLINIC | Facility: MEDICAL CENTER | Age: 41
End: 2021-08-23
Payer: COMMERCIAL

## 2021-08-23 ENCOUNTER — APPOINTMENT (OUTPATIENT)
Dept: RADIOLOGY | Facility: MEDICAL CENTER | Age: 41
End: 2021-08-23
Payer: COMMERCIAL

## 2021-08-23 VITALS
HEART RATE: 61 BPM | HEIGHT: 66 IN | WEIGHT: 243.8 LBS | DIASTOLIC BLOOD PRESSURE: 69 MMHG | SYSTOLIC BLOOD PRESSURE: 105 MMHG | BODY MASS INDEX: 39.18 KG/M2

## 2021-08-23 DIAGNOSIS — M25.521 RIGHT ELBOW PAIN: ICD-10-CM

## 2021-08-23 DIAGNOSIS — M25.521 RIGHT ELBOW PAIN: Primary | ICD-10-CM

## 2021-08-23 DIAGNOSIS — S52.124A CLOSED NONDISPLACED FRACTURE OF HEAD OF RIGHT RADIUS, INITIAL ENCOUNTER: ICD-10-CM

## 2021-08-23 PROCEDURE — 73080 X-RAY EXAM OF ELBOW: CPT

## 2021-08-23 PROCEDURE — 99214 OFFICE O/P EST MOD 30 MIN: CPT | Performed by: ORTHOPAEDIC SURGERY

## 2021-08-23 PROCEDURE — 3008F BODY MASS INDEX DOCD: CPT | Performed by: ORTHOPAEDIC SURGERY

## 2021-08-23 NOTE — LETTER
August 23, 2021     Patient: Justen Marroquin   YOB: 1980   Date of Visit: 8/23/2021       To Whom it May Concern:    Justen Marroquin is under my professional care  She was seen in my office on 8/23/2021  She can return to work with no use of the right upper extremity  She will follow up in 3 weeks for re-evaluation  If you have any questions or concerns, please don't hesitate to call           Sincerely,          Hira Guzman MD        CC: No Recipients

## 2021-08-23 NOTE — PROGRESS NOTES
CHIEF COMPLAINT:  Chief Complaint   Patient presents with    Right Elbow - Pain       SUBJECTIVE:  Aniya Baird is a 39y o  year old female who presents right elbow pain  Patient states on 08/08/2021 she fell on the outstretched hand and landed on her elbow  She sustained a fracture of the radial head  She was seen in the emergency room and was placed into a splint  She then followed up with Linda Barraza PA-C  She was concern for LCL ligament injury and ordered an MRI  Patient has maintained the splint as well as a sling  She states she has significant pain over the lateral aspect of her elbow at the radial head  She has pain with pronation and supination  She will occasionally have some numbness and tingling that goes into her hands        PAST MEDICAL HISTORY:  Past Medical History:   Diagnosis Date    Abnormal Papanicolaou smear of cervix     Asthma     Endometriosis 02/06/2020    Kidney stones     Left breast lump     Papanicolaou smear for cervical cancer screening 2016       PAST SURGICAL HISTORY:  Past Surgical History:   Procedure Laterality Date    CHOLECYSTECTOMY      TUBAL LIGATION         FAMILY HISTORY:  Family History   Problem Relation Age of Onset    Diabetes Mother     COPD Mother     Other Mother         Multiple breast cysts     Diabetes Father     Heart disease Father     Heart failure Father     Breast cancer Maternal Aunt        SOCIAL HISTORY:  Social History     Tobacco Use    Smoking status: Current Every Day Smoker     Packs/day: 1 00     Years: 29 00     Pack years: 29 00     Types: Cigarettes    Smokeless tobacco: Current User    Tobacco comment: No cigarettes since 08/10/2018, was down to half a pack a day   Vaping Use    Vaping Use: Never used   Substance Use Topics    Alcohol use: No    Drug use: Yes     Types: Marijuana     Comment: last used: 08/07/21       MEDICATIONS:    Current Outpatient Medications:     ACETAMINOPHEN PO, Take by mouth, Disp: , Rfl:     buPROPion (WELLBUTRIN XL) 150 mg 24 hr tablet, Take 150 mg by mouth daily, Disp: , Rfl:     IBUPROFEN PO, Take by mouth, Disp: , Rfl:     Orilissa 150 MG TABS, Take 1 tablet by mouth daily, Disp: 90 tablet, Rfl: 1    ALLERGIES:  Allergies   Allergen Reactions    Erythromycin Hives    Other      Bee sting       REVIEW OF SYSTEMS:  Review of Systems  ROS:   General: no fever, no chills  HEENT:  No loss of hearing or eyesight problems  Eyes:  No red eyes  Respiratory:  No coughing, shortness of breath or wheezing  Cardiovascular:  No chest pain, no palpitations  GI:  Abdomen soft nontender, denies nausea  Endocrine:  No muscle weakness, no frequent urination, no excessive thirst  Urinary:  No dysuria, no incontinence  Musculoskeletal: see HPI and PE  SKIN:  No skin rash, no dry skin  Neurological:  No headaches, no confusion  Psychiatric:  No suicide thoughts, no anxiety, no depression  Review of all other systems is negative    VITALS:  Vitals:    08/23/21 1037   BP: 105/69   Pulse: 61       LABS:  HgA1c:   Lab Results   Component Value Date    HGBA1C 5 6 08/14/2018     BMP:   Lab Results   Component Value Date    CALCIUM 9 1 10/02/2020    K 4 6 10/02/2020    CO2 33 (H) 10/02/2020     (H) 10/02/2020    BUN 20 10/02/2020    CREATININE 0 85 10/02/2020       _____________________________________________________  PHYSICAL EXAMINATION:  General: well developed and well nourished, alert, oriented times 3 and appears comfortable  Psychiatric: Normal  HEENT: Trachea Midline, No torticollis  Pulmonary: No audible wheezing or strider  Cardiovascular: No discernable arrhythmia   Skin: No masses, erythema, lacerations, fluctation, ulcerations  Neurovascular: Sensation Intact to the Median, Ulnar, Radial Nerve, Motor Intact to the Median, Ulnar, Radial Nerve and Pulses Intact    MUSCULOSKELETAL EXAMINATION:  Right elbow   No erythema edema or ecchymosis noted, skin is warm to touch   Tenderness to palpation over the radial head   She has stiffness with range of motion with flexion and extension of the elbow, pronation and supination is limited due to pain       ___________________________________________________  STUDIES REVIEWED:  Images obtained today of the Right elbow 3 views demonstrate Radial head fracture minimal displacement, coronoid fracture      PROCEDURES PERFORMED:  Procedures  No Procedures performed today    _____________________________________________________  ASSESSMENT/PLAN:      Right radial head fracture, coronoid fracture  - patient was advised to continue to use the sling for comfort   -she was advised to work on gentle range of motion with pronation, supination, flexion and extension  -she can take Tylenol and anti-inflammatories as needed for pain  -she will follow-up in 3 weeks for re-evaluation and x-rays of the right elbow    Follow Up:  Return in about 3 weeks (around 9/13/2021)  Work/school status:  No restrictions    To Do Next Visit:  Re-evaluation of current issue and X-rays of the  right  elbow        Scribe Attestation    I,:  Micky Ervin PA-C am acting as a scribe while in the presence of the attending physician :       I,:  Shantell Ramires MD personally performed the services described in this documentation    as scribed in my presence :           Portions of the record may have been created with voice recognition software  Occasional wrong word or "sound a like" substitutions may have occurred due to the inherent limitations of voice recognition software  Read the chart carefully and recognize, using context, where substitutions have occurred

## 2021-09-13 ENCOUNTER — OFFICE VISIT (OUTPATIENT)
Dept: OBGYN CLINIC | Facility: MEDICAL CENTER | Age: 41
End: 2021-09-13
Payer: COMMERCIAL

## 2021-09-13 ENCOUNTER — APPOINTMENT (OUTPATIENT)
Dept: RADIOLOGY | Facility: MEDICAL CENTER | Age: 41
End: 2021-09-13
Payer: COMMERCIAL

## 2021-09-13 VITALS
DIASTOLIC BLOOD PRESSURE: 83 MMHG | SYSTOLIC BLOOD PRESSURE: 126 MMHG | WEIGHT: 243.8 LBS | BODY MASS INDEX: 39.18 KG/M2 | HEART RATE: 69 BPM | HEIGHT: 66 IN

## 2021-09-13 DIAGNOSIS — S52.124A CLOSED NONDISPLACED FRACTURE OF HEAD OF RIGHT RADIUS, INITIAL ENCOUNTER: ICD-10-CM

## 2021-09-13 DIAGNOSIS — S52.124A CLOSED NONDISPLACED FRACTURE OF HEAD OF RIGHT RADIUS, INITIAL ENCOUNTER: Primary | ICD-10-CM

## 2021-09-13 PROCEDURE — 73080 X-RAY EXAM OF ELBOW: CPT

## 2021-09-13 PROCEDURE — 99213 OFFICE O/P EST LOW 20 MIN: CPT | Performed by: PHYSICIAN ASSISTANT

## 2021-09-13 NOTE — LETTER
September 13, 2021     Patient: Hector Kaba   YOB: 1980   Date of Visit: 9/13/2021       To Whom it May Concern:    Hector Kaba is under my professional care  She was seen in my office on 9/13/2021  She can return to work with no use of the right upper extremity  She will follow up in 3 weeks for re-evaluation  If you have any questions or concerns, please don't hesitate to call  Sincerely,          Silvano Miramontes PA-C        CC: No Recipients

## 2021-09-13 NOTE — PROGRESS NOTES
CHIEF COMPLAINT:  Chief Complaint   Patient presents with    Right Elbow - Follow-up       SUBJECTIVE:  Nahomi Cohen is a 39y o  year old female who presents for follow-up regarding right radial head fracture, right coronoid fracture  Patient was advised to use a sling as well as to work on gentle range of motion with elbow flexion, extension, pronation and supination at her last visit  Patient's initial injury occurred on 08/08/2021 when she fell on the outstretched hand landing directly onto her elbow  She states that she has some tenderness over her lateral aspect of the elbow  She has been working on gentle range of motion with flexion, extension, pronation and supination  She has been taking Tylenol and anti-inflammatories as needed for pain with minimal relief        PAST MEDICAL HISTORY:  Past Medical History:   Diagnosis Date    Abnormal Papanicolaou smear of cervix     Asthma     Endometriosis 02/06/2020    Kidney stones     Left breast lump     Papanicolaou smear for cervical cancer screening 2016       PAST SURGICAL HISTORY:  Past Surgical History:   Procedure Laterality Date    CHOLECYSTECTOMY      TUBAL LIGATION         FAMILY HISTORY:  Family History   Problem Relation Age of Onset    Diabetes Mother     COPD Mother     Other Mother         Multiple breast cysts     Diabetes Father     Heart disease Father     Heart failure Father     Breast cancer Maternal Aunt        SOCIAL HISTORY:  Social History     Tobacco Use    Smoking status: Current Every Day Smoker     Packs/day: 1 00     Years: 29 00     Pack years: 29 00     Types: Cigarettes    Smokeless tobacco: Current User    Tobacco comment: No cigarettes since 08/10/2018, was down to half a pack a day   Vaping Use    Vaping Use: Never used   Substance Use Topics    Alcohol use: No    Drug use: Yes     Types: Marijuana     Comment: last used: 08/07/21       MEDICATIONS:    Current Outpatient Medications:     buPROPion (WELLBUTRIN XL) 150 mg 24 hr tablet, Take 150 mg by mouth daily, Disp: , Rfl:     Orilissa 150 MG TABS, Take 1 tablet by mouth daily, Disp: 90 tablet, Rfl: 1    ALLERGIES:  Allergies   Allergen Reactions    Erythromycin Hives    Other      Bee sting       REVIEW OF SYSTEMS:  Review of Systems  ROS:   General: no fever, no chills  HEENT:  No loss of hearing or eyesight problems  Eyes:  No red eyes  Respiratory:  No coughing, shortness of breath or wheezing  Cardiovascular:  No chest pain, no palpitations  GI:  Abdomen soft nontender, denies nausea  Endocrine:  No muscle weakness, no frequent urination, no excessive thirst  Urinary:  No dysuria, no incontinence  Musculoskeletal: see HPI and PE  SKIN:  No skin rash, no dry skin  Neurological:  No headaches, no confusion  Psychiatric:  No suicide thoughts, no anxiety, no depression  Review of all other systems is negative    VITALS:  Vitals:    09/13/21 1136   BP: 126/83   Pulse: 69       LABS:  HgA1c:   Lab Results   Component Value Date    HGBA1C 5 6 08/14/2018     BMP:   Lab Results   Component Value Date    CALCIUM 9 1 10/02/2020    K 4 6 10/02/2020    CO2 33 (H) 10/02/2020     (H) 10/02/2020    BUN 20 10/02/2020    CREATININE 0 85 10/02/2020       _____________________________________________________  PHYSICAL EXAMINATION:  General: well developed and well nourished, alert, oriented times 3 and appears comfortable  Psychiatric: Normal  HEENT: Trachea Midline, No torticollis  Pulmonary: No audible wheezing or respiratory distress   Skin: No masses, erythema, lacerations, fluctation, ulcerations  Neurovascular: Sensation Intact to the Median, Ulnar, Radial Nerve, Motor Intact to the Median, Ulnar, Radial Nerve and Pulses Intact    MUSCULOSKELETAL EXAMINATION:  Right elbow  No erythema edema or ecchymosis noted, skin is warm to touch   Tenderness to palpation over the radial head  She has decreased range of motion at the elbow with flexion and extension compared to the contralateral side   She has decreased range of motion with pronation and supination compared to the contralateral side    ___________________________________________________  STUDIES REVIEWED:  Images obtained today of the Right elbow 3 views demonstrate Fracture of the radial head with minimal step-off, fracture of coronoid appreciated with no displacement      PROCEDURES PERFORMED:  Procedures  No Procedures performed today    _____________________________________________________  ASSESSMENT/PLAN:    Right radial head fracture, coronoid fracture DOI 8/8/2021  - patient was advised to begin to work with PT/OT on range of motion   -she can take Tylenol and anti-inflammatories as needed for pain  -she is given a work note for no use of the right upper extremity  - she will follow-up in 3 weeks for re-evaluation with Dr Erin Whaley or Dr Jesus Perez         Follow Up:  Return in about 3 weeks (around 10/4/2021) for with Dr Jesus Perez or Dr Erin Whaley  Work/school status:  No use of the right upper extremity    To Do Next Visit:  Re-evaluation of current issue          Portions of the record may have been created with voice recognition software  Occasional wrong word or "sound a like" substitutions may have occurred due to the inherent limitations of voice recognition software  Read the chart carefully and recognize, using context, where substitutions have occurred

## 2021-09-20 ENCOUNTER — EVALUATION (OUTPATIENT)
Dept: OCCUPATIONAL THERAPY | Facility: CLINIC | Age: 41
End: 2021-09-20
Payer: COMMERCIAL

## 2021-09-20 DIAGNOSIS — S52.124A CLOSED NONDISPLACED FRACTURE OF HEAD OF RIGHT RADIUS, INITIAL ENCOUNTER: Primary | ICD-10-CM

## 2021-09-20 PROCEDURE — 97165 OT EVAL LOW COMPLEX 30 MIN: CPT

## 2021-09-20 PROCEDURE — 97110 THERAPEUTIC EXERCISES: CPT

## 2021-09-20 NOTE — PROGRESS NOTES
OT Evaluation     Today's date: 2021  Patient name: Zohra Evans  : 1980  MRN: [id number]  Referring provider: Lexx López PA-C  Dx:   Encounter Diagnosis     ICD-10-CM    1  Closed nondisplaced fracture of head of right radius, initial encounter  S52 124A Ambulatory referral to PT/OT hand therapy       Start Time: 0800  Stop Time: 0850  Total time in clinic (min): 50 minutes    Assessment  Assessment details: Zohra Evans is a 39 y o  female who presents with Closed nondisplaced fracture of head of right radius, initial encounter, referred by Dr Abad Horton from orthopedics after a fall on   She was immobilized for several weeks then placed in a sling  She has been mostly out of the sling for about a week but is significantly limited by stiffness  She has occasional nocturnal numbness along ulnar N distribution likely due to positioning  Patient presented to therapy with below listed impairments, including significant elbow joint stiffness, swelling, pain and reduced strength which impact participation in ADL/IADL  Zohra Evans would benefit from skilled OPOT as recommended to address these deficits, return to best level of function, and maximize (I) in ADL/IADL  Impairments: activity intolerance, impaired physical strength, lacks appropriate home exercise program, pain with function and poor body mechanics    Symptom irritability: moderateUnderstanding of Dx/Px/POC: good   Prognosis: fair    Goals  STGs:  1  Pt will be independent and compliant with HEP (1-2 visits)  2  Decrease pain with activity by 3 levels at worst (4-6 weeks)  3  Increase R elbow AROM by 15* (4-6 weeks)  4  Increase  strength by 10# (4-6 weeks)    LTGs (to be achieved by discharge):  1  Pt will be able to use RUE for work activities with pain no greater than 4/10   2  Increase  to [facility]  3   Improve FOTO score to predicted value    Plan  Patient would benefit from: OT eval and skilled occupational therapy  Planned modality interventions: iontophoresis, thermotherapy: hydrocollator packs and ultrasound  Planned therapy interventions: activity modification, Espinoza taping, neuromuscular re-education, manual therapy, joint mobilization, massage, strengthening, stretching, therapeutic activities, therapeutic exercise, home exercise program, graded exercise, graded activity and functional ROM exercises  Frequency: 2x week  Duration in visits: 10  Treatment plan discussed with: patient and referring physician        Subjective Evaluation    History of Present Illness  Mechanism of injury: Patient fell over outstretched arm 8/8  Pain  Current pain ratin  At worst pain rating: 10  Location: 10/10 with pressure but 4/10 with gentle use   Relieving factors: support and ice  Aggravating factors: lifting  Progression: improved    Social Support    Employment status: working ([employer] previously, starting as [employer])  Hand dominance: right      Diagnostic Tests  Abnormal x-ray: radial head and coronoid fx, stable healing   Treatments  Previous treatment: medication  Current treatment: occupational therapy  Patient Goals  Patient goal: to move, to straighten arm as much as possible  Objective     Observations     Right Elbow   Positive for edema       Neurological Testing     Additional Neurological Details  Subjective numbness on last 3 fingers when she wakes up in the AM     Active Range of Motion     Left Elbow   Normal active range of motion  Elbow hyperextension    Right Elbow   Flexion: 80 degrees   Extension: -45 degrees   Forearm supination: 25 degrees   Forearm pronation: 75 degrees     Right Wrist   Wrist flexion: 50 degrees   Wrist extension: 50 degrees   Radial deviation: 15 degrees   Ulnar deviation: 30 degrees     Additional Active Range of Motion Details  Full digital AROM to [facility] and opposes thumb to small finger with ease    Passive Range of Motion     Right Elbow   Extension: -35 degrees     Additional Passive Range of Motion Details  TTP Lateral epicondyle    Strength/Myotome Testing     Left Wrist/Hand      (2nd hand position)     Trial 1: 67    Trial 2: 62    Trial 3: 52    Right Wrist/Hand      (2nd hand position)     Trial 1: 27    Trial 2: 26    Trial 3: 27    Swelling   Left Elbow Girth Measurements   Joint line: 37 cm    Right Elbow Girth Measurements   Joint line: 38 cm             Precautions:        Manuals 9/20            Compression E                                                   Neuro Re-Ed                                                                                                        Ther Ex             HEP: AROM, AAROM, PROM elbow forearm wrist 15'                                                                                                       Ther Activity                                                                               Modalities

## 2021-09-27 ENCOUNTER — OFFICE VISIT (OUTPATIENT)
Dept: OCCUPATIONAL THERAPY | Facility: CLINIC | Age: 41
End: 2021-09-27
Payer: COMMERCIAL

## 2021-09-27 DIAGNOSIS — S52.124A CLOSED NONDISPLACED FRACTURE OF HEAD OF RIGHT RADIUS, INITIAL ENCOUNTER: Primary | ICD-10-CM

## 2021-09-27 PROCEDURE — 97140 MANUAL THERAPY 1/> REGIONS: CPT | Performed by: OCCUPATIONAL THERAPIST

## 2021-09-27 PROCEDURE — 97110 THERAPEUTIC EXERCISES: CPT | Performed by: OCCUPATIONAL THERAPIST

## 2021-09-27 NOTE — PROGRESS NOTES
Daily Note     Today's date: 2021  Patient name: Brandie Tovar  : 1980  MRN: [id number]  Referring provider: Maru Stark PA-C  Dx:   Encounter Diagnosis     ICD-10-CM    1  Closed nondisplaced fracture of head of right radius, initial encounter  S52 124A                   Subjective: "it is a little sore today", due to activity from work  She also states that at end range active extension, it feels like the elbow "locks" on the lateral aspect  Objective: See treatment diary below  EE=-32, XZ=326    Assessment: Bicep and tricep tightness contributing to reduced elbow motion  Good progress notes from initial evaluation  Issued modified HIPOLITO for HEP due to c/o ulnar digital numbness  Plan: Progress treatment as tolerated         Precautions:        Manuals            Compression E            Elbow mobs for flexion/extension   8'                                     Neuro Re-Ed                                                                                                        Ther Ex             HEP: AROM, AAROM, PROM elbow forearm wrist 15' HIPOLITO           Stretching as tolerated  Wrist, elbow, 10'           Wrist maze  5x           Wall slides for EE  10x, 5 seconds            Pulleys  3 mins            Ball bounce for sup/pro  1 5 mins                                     Ther Activity                                                                               Modalities             MHP  5'

## 2021-09-29 ENCOUNTER — OFFICE VISIT (OUTPATIENT)
Dept: OCCUPATIONAL THERAPY | Facility: CLINIC | Age: 41
End: 2021-09-29
Payer: COMMERCIAL

## 2021-09-29 DIAGNOSIS — S52.124A CLOSED NONDISPLACED FRACTURE OF HEAD OF RIGHT RADIUS, INITIAL ENCOUNTER: Primary | ICD-10-CM

## 2021-09-29 PROCEDURE — 97140 MANUAL THERAPY 1/> REGIONS: CPT | Performed by: OCCUPATIONAL THERAPIST

## 2021-09-29 PROCEDURE — 97110 THERAPEUTIC EXERCISES: CPT | Performed by: OCCUPATIONAL THERAPIST

## 2021-09-29 NOTE — PROGRESS NOTES
Daily Note     Today's date: 2021  Patient name: Nahomi Cohen  : 1980  MRN: [id number]  Referring provider: Anat Valentin PA-C  Dx:   Encounter Diagnosis     ICD-10-CM    1  Closed nondisplaced fracture of head of right radius, initial encounter  S52 124A                   Subjective: "it hurts today"  Also, she states that HIPOLITO has been helping symptoms of numbness in the fingers  Objective: See treatment diary below  EE=-26 (was -32) , GM=167 (was 105)    Assessment: Continues to demonstrate improvement in the elbow regarding motion, however bicep and tricep tightness remains a limiting factor  Plan: Progress treatment as tolerated         Precautions:        Manuals           Compression E            Elbow mobs for flexion/extension   8' 8'                                    Neuro Re-Ed                                                                                                        Ther Ex             HEP: AROM, AAROM, PROM elbow forearm wrist 15' HIPOLITO           Stretching as tolerated  Wrist, elbow, 10' 10'          Wrist maze  5x 10x          Wall slides for EE  10x, 5 seconds  10x, 5 seconds up and sideways           Pulleys  3 mins  3 mins          Ball bounce for sup/pro  1 5 mins 1 5 mins          EF with shoulder flexion overhead   20x                       Ther Activity                                                                               Modalities             MHP  5' 5'

## 2021-10-04 ENCOUNTER — OFFICE VISIT (OUTPATIENT)
Dept: OCCUPATIONAL THERAPY | Facility: CLINIC | Age: 41
End: 2021-10-04
Payer: COMMERCIAL

## 2021-10-04 DIAGNOSIS — S52.124A CLOSED NONDISPLACED FRACTURE OF HEAD OF RIGHT RADIUS, INITIAL ENCOUNTER: Primary | ICD-10-CM

## 2021-10-04 PROCEDURE — 97140 MANUAL THERAPY 1/> REGIONS: CPT | Performed by: OCCUPATIONAL THERAPIST

## 2021-10-04 PROCEDURE — 97110 THERAPEUTIC EXERCISES: CPT | Performed by: OCCUPATIONAL THERAPIST

## 2021-10-06 ENCOUNTER — OFFICE VISIT (OUTPATIENT)
Dept: OCCUPATIONAL THERAPY | Facility: CLINIC | Age: 41
End: 2021-10-06
Payer: COMMERCIAL

## 2021-10-06 DIAGNOSIS — S52.124A CLOSED NONDISPLACED FRACTURE OF HEAD OF RIGHT RADIUS, INITIAL ENCOUNTER: Primary | ICD-10-CM

## 2021-10-06 PROCEDURE — 97110 THERAPEUTIC EXERCISES: CPT | Performed by: OCCUPATIONAL THERAPIST

## 2021-10-06 PROCEDURE — 97140 MANUAL THERAPY 1/> REGIONS: CPT | Performed by: OCCUPATIONAL THERAPIST

## 2021-10-11 ENCOUNTER — APPOINTMENT (OUTPATIENT)
Dept: OCCUPATIONAL THERAPY | Facility: CLINIC | Age: 41
End: 2021-10-11
Payer: COMMERCIAL

## 2021-10-13 ENCOUNTER — APPOINTMENT (OUTPATIENT)
Dept: OCCUPATIONAL THERAPY | Facility: CLINIC | Age: 41
End: 2021-10-13
Payer: COMMERCIAL

## 2021-10-14 ENCOUNTER — APPOINTMENT (OUTPATIENT)
Dept: OCCUPATIONAL THERAPY | Facility: CLINIC | Age: 41
End: 2021-10-14
Payer: COMMERCIAL

## 2021-10-18 ENCOUNTER — OFFICE VISIT (OUTPATIENT)
Dept: OCCUPATIONAL THERAPY | Facility: CLINIC | Age: 41
End: 2021-10-18
Payer: COMMERCIAL

## 2021-10-18 DIAGNOSIS — S52.124A CLOSED NONDISPLACED FRACTURE OF HEAD OF RIGHT RADIUS, INITIAL ENCOUNTER: Primary | ICD-10-CM

## 2021-10-18 PROCEDURE — 97110 THERAPEUTIC EXERCISES: CPT | Performed by: OCCUPATIONAL THERAPIST

## 2021-10-18 PROCEDURE — 97760 ORTHOTIC MGMT&TRAING 1ST ENC: CPT | Performed by: OCCUPATIONAL THERAPIST

## 2021-10-18 PROCEDURE — 97140 MANUAL THERAPY 1/> REGIONS: CPT | Performed by: OCCUPATIONAL THERAPIST

## 2021-10-20 ENCOUNTER — APPOINTMENT (OUTPATIENT)
Dept: OCCUPATIONAL THERAPY | Facility: CLINIC | Age: 41
End: 2021-10-20
Payer: COMMERCIAL

## 2021-10-21 ENCOUNTER — APPOINTMENT (OUTPATIENT)
Dept: OCCUPATIONAL THERAPY | Facility: CLINIC | Age: 41
End: 2021-10-21
Payer: COMMERCIAL

## 2021-10-22 ENCOUNTER — OFFICE VISIT (OUTPATIENT)
Dept: OBGYN CLINIC | Facility: HOSPITAL | Age: 41
End: 2021-10-22
Payer: COMMERCIAL

## 2021-10-22 ENCOUNTER — HOSPITAL ENCOUNTER (OUTPATIENT)
Dept: RADIOLOGY | Facility: HOSPITAL | Age: 41
Discharge: HOME | End: 2021-10-22
Attending: SURGERY
Payer: COMMERCIAL

## 2021-10-22 VITALS
DIASTOLIC BLOOD PRESSURE: 76 MMHG | BODY MASS INDEX: 40.02 KG/M2 | WEIGHT: 249 LBS | RESPIRATION RATE: 17 BRPM | HEART RATE: 73 BPM | HEIGHT: 66 IN | SYSTOLIC BLOOD PRESSURE: 136 MMHG

## 2021-10-22 DIAGNOSIS — S52.121A CLOSED DISPLACED FRACTURE OF HEAD OF RIGHT RADIUS, INITIAL ENCOUNTER: ICD-10-CM

## 2021-10-22 DIAGNOSIS — S52.044A NONDISPLACED FRACTURE OF CORONOID PROCESS OF RIGHT ULNA, INITIAL ENCOUNTER FOR CLOSED FRACTURE: ICD-10-CM

## 2021-10-22 DIAGNOSIS — M25.521 PAIN IN RIGHT ELBOW: ICD-10-CM

## 2021-10-22 DIAGNOSIS — M25.521 PAIN IN RIGHT ELBOW: Primary | ICD-10-CM

## 2021-10-22 PROCEDURE — 73080 X-RAY EXAM OF ELBOW: CPT

## 2021-10-22 PROCEDURE — 99214 OFFICE O/P EST MOD 30 MIN: CPT | Performed by: SURGERY

## 2021-10-22 RX ORDER — MELOXICAM 15 MG/1
15 TABLET ORAL DAILY
Qty: 30 TABLET | Refills: 1 | Status: SHIPPED | OUTPATIENT
Start: 2021-10-22

## 2021-10-22 RX ORDER — METHYLPREDNISOLONE 4 MG/1
TABLET ORAL
Qty: 1 EACH | Refills: 1 | Status: SHIPPED | OUTPATIENT
Start: 2021-10-22

## 2021-10-25 ENCOUNTER — OFFICE VISIT (OUTPATIENT)
Dept: OCCUPATIONAL THERAPY | Facility: CLINIC | Age: 41
End: 2021-10-25
Payer: COMMERCIAL

## 2021-10-25 DIAGNOSIS — S52.124A CLOSED NONDISPLACED FRACTURE OF HEAD OF RIGHT RADIUS, INITIAL ENCOUNTER: Primary | ICD-10-CM

## 2021-10-25 PROCEDURE — 97110 THERAPEUTIC EXERCISES: CPT | Performed by: OCCUPATIONAL THERAPIST

## 2021-10-25 PROCEDURE — 97140 MANUAL THERAPY 1/> REGIONS: CPT | Performed by: OCCUPATIONAL THERAPIST

## 2021-10-28 ENCOUNTER — OFFICE VISIT (OUTPATIENT)
Dept: OCCUPATIONAL THERAPY | Facility: CLINIC | Age: 41
End: 2021-10-28
Payer: COMMERCIAL

## 2021-10-28 DIAGNOSIS — S52.124A CLOSED NONDISPLACED FRACTURE OF HEAD OF RIGHT RADIUS, INITIAL ENCOUNTER: Primary | ICD-10-CM

## 2021-10-28 PROCEDURE — 97110 THERAPEUTIC EXERCISES: CPT

## 2021-10-28 PROCEDURE — 97112 NEUROMUSCULAR REEDUCATION: CPT

## 2021-10-28 PROCEDURE — 97140 MANUAL THERAPY 1/> REGIONS: CPT

## 2021-11-01 ENCOUNTER — APPOINTMENT (OUTPATIENT)
Dept: OCCUPATIONAL THERAPY | Facility: CLINIC | Age: 41
End: 2021-11-01
Payer: COMMERCIAL

## 2021-11-04 ENCOUNTER — APPOINTMENT (OUTPATIENT)
Dept: OCCUPATIONAL THERAPY | Facility: CLINIC | Age: 41
End: 2021-11-04
Payer: COMMERCIAL

## 2021-11-08 ENCOUNTER — APPOINTMENT (OUTPATIENT)
Dept: OCCUPATIONAL THERAPY | Facility: CLINIC | Age: 41
End: 2021-11-08
Payer: COMMERCIAL

## 2021-11-15 ENCOUNTER — OFFICE VISIT (OUTPATIENT)
Dept: OCCUPATIONAL THERAPY | Facility: CLINIC | Age: 41
End: 2021-11-15
Payer: COMMERCIAL

## 2021-11-15 DIAGNOSIS — S52.124A CLOSED NONDISPLACED FRACTURE OF HEAD OF RIGHT RADIUS, INITIAL ENCOUNTER: Primary | ICD-10-CM

## 2021-11-15 PROCEDURE — 97110 THERAPEUTIC EXERCISES: CPT | Performed by: OCCUPATIONAL THERAPIST

## 2021-11-15 PROCEDURE — 97140 MANUAL THERAPY 1/> REGIONS: CPT | Performed by: OCCUPATIONAL THERAPIST

## 2021-11-22 ENCOUNTER — EVALUATION (OUTPATIENT)
Dept: OCCUPATIONAL THERAPY | Facility: CLINIC | Age: 41
End: 2021-11-22
Payer: COMMERCIAL

## 2021-11-22 DIAGNOSIS — S52.124A CLOSED NONDISPLACED FRACTURE OF HEAD OF RIGHT RADIUS, INITIAL ENCOUNTER: Primary | ICD-10-CM

## 2021-11-22 PROCEDURE — 97110 THERAPEUTIC EXERCISES: CPT

## 2021-12-08 ENCOUNTER — OFFICE VISIT (OUTPATIENT)
Dept: OBGYN CLINIC | Facility: CLINIC | Age: 41
End: 2021-12-08
Payer: COMMERCIAL

## 2021-12-08 VITALS
RESPIRATION RATE: 18 BRPM | BODY MASS INDEX: 40.02 KG/M2 | WEIGHT: 249 LBS | HEIGHT: 66 IN | HEART RATE: 81 BPM | SYSTOLIC BLOOD PRESSURE: 125 MMHG | DIASTOLIC BLOOD PRESSURE: 82 MMHG

## 2021-12-08 DIAGNOSIS — S52.121D CLOSED DISPLACED FRACTURE OF HEAD OF RIGHT RADIUS WITH ROUTINE HEALING, SUBSEQUENT ENCOUNTER: ICD-10-CM

## 2021-12-08 DIAGNOSIS — S52.044D NONDISPLACED FRACTURE OF CORONOID PROCESS OF RIGHT ULNA, SUBSEQUENT ENCOUNTER FOR CLOSED FRACTURE WITH ROUTINE HEALING: Primary | ICD-10-CM

## 2021-12-08 PROCEDURE — 20605 DRAIN/INJ JOINT/BURSA W/O US: CPT | Performed by: SURGERY

## 2021-12-08 PROCEDURE — 99213 OFFICE O/P EST LOW 20 MIN: CPT | Performed by: SURGERY

## 2021-12-08 RX ADMIN — DEXAMETHASONE SODIUM PHOSPHATE 40 MG: 100 INJECTION INTRAMUSCULAR; INTRAVENOUS at 18:05

## 2021-12-08 RX ADMIN — LIDOCAINE HYDROCHLORIDE 1 ML: 10 INJECTION, SOLUTION INFILTRATION; PERINEURAL at 18:05

## 2021-12-09 RX ORDER — DEXAMETHASONE SODIUM PHOSPHATE 100 MG/10ML
40 INJECTION INTRAMUSCULAR; INTRAVENOUS
Status: COMPLETED | OUTPATIENT
Start: 2021-12-08 | End: 2021-12-08

## 2021-12-09 RX ORDER — LIDOCAINE HYDROCHLORIDE 10 MG/ML
1 INJECTION, SOLUTION INFILTRATION; PERINEURAL
Status: COMPLETED | OUTPATIENT
Start: 2021-12-08 | End: 2021-12-08

## 2021-12-14 ENCOUNTER — OFFICE VISIT (OUTPATIENT)
Dept: OCCUPATIONAL THERAPY | Facility: CLINIC | Age: 41
End: 2021-12-14
Payer: COMMERCIAL

## 2021-12-14 DIAGNOSIS — S52.124A CLOSED NONDISPLACED FRACTURE OF HEAD OF RIGHT RADIUS, INITIAL ENCOUNTER: Primary | ICD-10-CM

## 2021-12-14 PROCEDURE — 97140 MANUAL THERAPY 1/> REGIONS: CPT | Performed by: OCCUPATIONAL THERAPIST

## 2021-12-14 PROCEDURE — 97110 THERAPEUTIC EXERCISES: CPT | Performed by: OCCUPATIONAL THERAPIST

## 2021-12-20 ENCOUNTER — APPOINTMENT (OUTPATIENT)
Dept: OCCUPATIONAL THERAPY | Facility: CLINIC | Age: 41
End: 2021-12-20
Payer: COMMERCIAL

## 2021-12-27 ENCOUNTER — APPOINTMENT (OUTPATIENT)
Dept: OCCUPATIONAL THERAPY | Facility: CLINIC | Age: 41
End: 2021-12-27
Payer: COMMERCIAL

## 2022-01-04 PROCEDURE — U0003 INFECTIOUS AGENT DETECTION BY NUCLEIC ACID (DNA OR RNA); SEVERE ACUTE RESPIRATORY SYNDROME CORONAVIRUS 2 (SARS-COV-2) (CORONAVIRUS DISEASE [COVID-19]), AMPLIFIED PROBE TECHNIQUE, MAKING USE OF HIGH THROUGHPUT TECHNOLOGIES AS DESCRIBED BY CMS-2020-01-R: HCPCS | Performed by: INTERNAL MEDICINE

## 2022-01-04 PROCEDURE — U0005 INFEC AGEN DETEC AMPLI PROBE: HCPCS | Performed by: INTERNAL MEDICINE

## 2022-01-05 ENCOUNTER — LAB REQUISITION (OUTPATIENT)
Dept: LAB | Facility: HOSPITAL | Age: 42
End: 2022-01-05
Payer: COMMERCIAL

## 2022-01-05 DIAGNOSIS — Z20.822 CONTACT WITH AND (SUSPECTED) EXPOSURE TO COVID-19: ICD-10-CM

## 2022-01-06 LAB — SARS-COV-2 RNA RESP QL NAA+PROBE: NEGATIVE

## 2022-03-07 ENCOUNTER — TELEPHONE (OUTPATIENT)
Dept: OBGYN CLINIC | Facility: HOSPITAL | Age: 42
End: 2022-03-07

## 2022-03-07 NOTE — TELEPHONE ENCOUNTER
Hello,    Please advise if the following patient can be forced onto the schedule:    Patient:  Margarita Mcdonough    : [birth year]    MRN:  [id number]    Call back #:  [phone]    Insurance:  [facility]  [employer]    Reason for appointment:  2 month f/u r-elbow    Requested doctor/location:  [place]

## 2022-03-21 ENCOUNTER — OFFICE VISIT (OUTPATIENT)
Dept: OBGYN CLINIC | Facility: HOSPITAL | Age: 42
End: 2022-03-21
Payer: COMMERCIAL

## 2022-03-21 VITALS
HEART RATE: 77 BPM | RESPIRATION RATE: 17 BRPM | BODY MASS INDEX: 41.14 KG/M2 | HEIGHT: 66 IN | DIASTOLIC BLOOD PRESSURE: 79 MMHG | SYSTOLIC BLOOD PRESSURE: 114 MMHG | WEIGHT: 256 LBS

## 2022-03-21 DIAGNOSIS — S52.044D NONDISPLACED FRACTURE OF CORONOID PROCESS OF RIGHT ULNA, SUBSEQUENT ENCOUNTER FOR CLOSED FRACTURE WITH ROUTINE HEALING: Primary | ICD-10-CM

## 2022-03-21 PROCEDURE — 99213 OFFICE O/P EST LOW 20 MIN: CPT | Performed by: SURGERY

## 2022-03-21 NOTE — PROGRESS NOTES
ASSESSMENT/PLAN:      39year old female here for her closed nondisplaced coronoid fracture and closed displaced radial head fracture of the right elbow  She has progressed with her ROM  Explained the cold weather will be uncomfortable for her with aching  She notes that taking the meloxicam prior to heavy work can help with her discomfort after activity  She understands and agrees with this plan of care  A work note was provided for the patient today to return to work with no restrictions  We will follow up with her as needed  The patient verbalized understanding of exam findings and treatment plan  We engaged in the shared decision-making process and treatment options were discussed at length with the patient  Surgical and conservative management discussed today along with risks and benefits  Diagnoses and all orders for this visit:    Nondisplaced fracture of coronoid process of right ulna, subsequent encounter for closed fracture with routine healing        Follow Up:  Return if symptoms worsen or fail to improve  To Do Next Visit:  Re-evaluation of current issue    ____________________________________________________________________________________________________________________________________________      CHIEF COMPLAINT:  Chief Complaint   Patient presents with    Right Elbow - Pain, Follow-up       SUBJECTIVE:  Noel Braswell is a 39y o  year old RHD female who presents here for 2 month follow up for her right elbow  Patient is 6 months s/p closed nondisplaced coronoid fracture and closed displaced radial head fracture of the right elbow  Since her last visit, a cortisone injection was administered to help with pain and getting her to use the Aparna splint  I have personally reviewed all the relevant PMH, PSH, SH, FH, Medications and allergies       PAST MEDICAL HISTORY:  Past Medical History:   Diagnosis Date    Abnormal Papanicolaou smear of cervix     Asthma     Endometriosis 02/06/2020    Kidney stones     Left breast lump     Papanicolaou smear for cervical cancer screening 2016       PAST SURGICAL HISTORY:  Past Surgical History:   Procedure Laterality Date    CHOLECYSTECTOMY      TUBAL LIGATION         FAMILY HISTORY:  Family History   Problem Relation Age of Onset    Diabetes Mother     COPD Mother     Other Mother         Multiple breast cysts     Diabetes Father     Heart disease Father     Heart failure Father     Breast cancer Maternal Aunt        SOCIAL HISTORY:  Social History     Tobacco Use    Smoking status: Current Every Day Smoker     Packs/day: 0 50     Years: 29 00     Pack years: 14 50     Types: Cigarettes    Smokeless tobacco: Current User   Vaping Use    Vaping Use: Never used   Substance Use Topics    Alcohol use: No    Drug use: Yes     Types: Marijuana     Comment: last used: 08/07/21       MEDICATIONS:    Current Outpatient Medications:     buPROPion (WELLBUTRIN XL) 150 mg 24 hr tablet, Take 150 mg by mouth daily, Disp: , Rfl:     Orilissa 150 MG TABS, Take 1 tablet by mouth daily, Disp: 90 tablet, Rfl: 1    meloxicam (Mobic) 15 mg tablet, Take 1 tablet (15 mg total) by mouth daily (Patient not taking: Reported on 12/8/2021 ), Disp: 30 tablet, Rfl: 1    methylPREDNISolone 4 MG tablet therapy pack, Use as directed on package (Patient not taking: Reported on 12/8/2021 ), Disp: 1 each, Rfl: 1    ALLERGIES:  Allergies   Allergen Reactions    Erythromycin Hives    Other      Bee sting       REVIEW OF SYSTEMS:  Review of Systems   Constitutional: Negative for chills, fever and unexpected weight change  HENT: Negative for hearing loss, nosebleeds and sore throat  Eyes: Negative for pain, redness and visual disturbance  Respiratory: Negative for cough, shortness of breath and wheezing  Cardiovascular: Negative for chest pain, palpitations and leg swelling  Gastrointestinal: Negative for abdominal pain and nausea     Genitourinary: Negative for dyspareunia, dysuria and frequency  Skin: Negative for rash and wound  Neurological: Negative for dizziness, numbness and headaches  Psychiatric/Behavioral: Negative for decreased concentration and suicidal ideas  The patient is not nervous/anxious  VITALS:  Vitals:    03/21/22 0734   BP: 114/79   Pulse: 77   Resp: 17       LABS:  HgA1c:   Lab Results   Component Value Date    HGBA1C 5 6 08/14/2018     BMP:   Lab Results   Component Value Date    CALCIUM 9 1 10/02/2020    K 4 6 10/02/2020    CO2 33 (H) 10/02/2020     (H) 10/02/2020    BUN 20 10/02/2020    CREATININE 0 85 10/02/2020       _____________________________________________________  PHYSICAL EXAMINATION:  General: well developed and well nourished, alert, oriented times 3 and appears comfortable  Psychiatric: Normal  HEENT: Normocephalic, Atraumatic Trachea Midline, No torticollis  Pulmonary: No audible wheezing or respiratory distress   Cardiovascular: No pitting edema, 2+ radial pulse   Abdominal/GI: abdomen non tender, non distended   Skin: No masses, erythema, lacerations, fluctation, ulcerations  Neurovascular: Sensation Intact to the Median, Ulnar, Radial Nerve, Motor Intact to the Median, Ulnar, Radial Nerve and Pulses Intact  Musculoskeletal: Normal, except as noted in detailed exam and in HPI  MUSCULOSKELETAL EXAMINATION:    Right elbow:     Extension +10  Flexion 140  Pronation full, Supination +5  Click with pronation and supination  Sensation intact to light touch   Brisk capillary refill     ___________________________________________________  STUDIES REVIEWED:  No images reviewed at today's today          PROCEDURES PERFORMED:  Procedures  No Procedures performed today    _____________________________________________________      Adan Smith    I,:  Rizwan Hardy am acting as a scribe while in the presence of the attending physician :       I,:  Cassidy Hallman MD personally performed the services described in this documentation    as scribed in my presence :

## 2022-03-21 NOTE — LETTER
March 21, 2022     Patient: Marc Houston   YOB: 1980   Date of Visit: 3/21/2022       To Whom it May Concern:    Marc Houston is under my professional care  She was seen in my office on 3/21/2022  She may return to work on 3/21/2022 full duty, no restrictions  If you have any questions or concerns, please don't hesitate to call           Sincerely,          Cassidy Hallman MD        CC: No Recipients

## 2022-05-03 ENCOUNTER — TELEPHONE (OUTPATIENT)
Dept: OBGYN CLINIC | Facility: HOSPITAL | Age: 42
End: 2022-05-03

## 2022-05-03 NOTE — TELEPHONE ENCOUNTER
Hello,  Please advise if the following patient can be forced onto the schedule:    Som Wilkerson    :1980    TUK:[id number]    Call back #:[phone]    Insurance:[facility]    Reason for appointment:rt elbow-injection  Pt prefers 1st thing in the morning or closest to the last appt in the evening    Requested doctor/location:Dr Lee/Any      Thank you  Good Eduardo

## 2022-06-09 ENCOUNTER — OFFICE VISIT (OUTPATIENT)
Dept: OBGYN CLINIC | Facility: CLINIC | Age: 42
End: 2022-06-09

## 2022-06-09 VITALS
WEIGHT: 258.4 LBS | HEIGHT: 66 IN | HEART RATE: 81 BPM | BODY MASS INDEX: 41.53 KG/M2 | RESPIRATION RATE: 17 BRPM | SYSTOLIC BLOOD PRESSURE: 113 MMHG | DIASTOLIC BLOOD PRESSURE: 77 MMHG

## 2022-06-09 DIAGNOSIS — M77.8 TRICEPS TENDINITIS: Primary | ICD-10-CM

## 2022-06-09 PROCEDURE — 99214 OFFICE O/P EST MOD 30 MIN: CPT | Performed by: SURGERY

## 2022-06-09 RX ORDER — METHYLPREDNISOLONE 4 MG/1
TABLET ORAL
Qty: 1 EACH | Refills: 1 | Status: SHIPPED | OUTPATIENT
Start: 2022-06-09

## 2022-06-09 RX ORDER — IBUPROFEN 600 MG/1
600 TABLET ORAL EVERY 8 HOURS PRN
Qty: 90 TABLET | Refills: 0 | Status: SHIPPED | OUTPATIENT
Start: 2022-06-09 | End: 2022-07-07

## 2022-06-09 NOTE — PROGRESS NOTES
ASSESSMENT/PLAN:    43year old female with right triceps tendinitis  Patient and I discussed that most of her symptoms seem to be stemming from triceps tendinitis  I would not recommend corticosteroid injection to this area  I did provide her with a Medrol Dosepak which she should start tomorrow  Once this is complete she may begin ibuprofen as prescribed  I would like her to take anti-inflammatory for 4 weeks and then take an additional Medrol Dosepak  She should begin hand therapy for triceps tendinitis  We will see her back in 6-8 weeks for re-evaluation  The patient verbalized understanding of exam findings and treatment plan  We engaged in the shared decision-making process and treatment options were discussed at length with the patient  Surgical and conservative management discussed today along with risks and benefits  Diagnoses and all orders for this visit:    Triceps tendinitis            Follow Up:  Return in about 8 weeks (around 8/4/2022)  To Do Next Visit:  Re-evaluation of current issue    ____________________________________________________________________________________________________________________________________________      CHIEF COMPLAINT:  Chief Complaint   Patient presents with    Right Elbow - Pain, Follow-up       SUBJECTIVE:  Caitlin Franco is a 43y o  year old RHD female who presents to the office today for a follow-up evaluation of her right elbow  Patient states she continues to have right elbow pain  This is more so at the distal triceps today rather than the lateral epicondyle  She has been unable to complete hand therapy as she has lost her insurance  She denies any new injuries  Her pain is worse with activities  I have personally reviewed all the relevant PMH, PSH, SH, FH, Medications and allergies       PAST MEDICAL HISTORY:  Past Medical History:   Diagnosis Date    Abnormal Papanicolaou smear of cervix     Asthma     Endometriosis 02/06/2020    Kidney stones     Left breast lump     Papanicolaou smear for cervical cancer screening 2016       PAST SURGICAL HISTORY:  Past Surgical History:   Procedure Laterality Date    CHOLECYSTECTOMY      TUBAL LIGATION         FAMILY HISTORY:  Family History   Problem Relation Age of Onset    Diabetes Mother     COPD Mother     Other Mother         Multiple breast cysts     Diabetes Father     Heart disease Father     Heart failure Father     Breast cancer Maternal Aunt        SOCIAL HISTORY:  Social History     Tobacco Use    Smoking status: Current Every Day Smoker     Packs/day: 0 50     Years: 29 00     Pack years: 14 50     Types: Cigarettes    Smokeless tobacco: Current User   Vaping Use    Vaping Use: Never used   Substance Use Topics    Alcohol use: No    Drug use: Yes     Types: Marijuana     Comment: last used: 08/07/21       MEDICATIONS:    Current Outpatient Medications:     buPROPion (WELLBUTRIN XL) 150 mg 24 hr tablet, Take 150 mg by mouth daily, Disp: , Rfl:     Orilissa 150 MG TABS, Take 1 tablet by mouth daily, Disp: 90 tablet, Rfl: 1    meloxicam (Mobic) 15 mg tablet, Take 1 tablet (15 mg total) by mouth daily (Patient not taking: No sig reported), Disp: 30 tablet, Rfl: 1    methylPREDNISolone 4 MG tablet therapy pack, Use as directed on package (Patient not taking: No sig reported), Disp: 1 each, Rfl: 1    ALLERGIES:  Allergies   Allergen Reactions    Erythromycin Hives    Other      Bee sting       REVIEW OF SYSTEMS:  Review of Systems   Constitutional: Negative for chills, fever and unexpected weight change  HENT: Negative for hearing loss, nosebleeds and sore throat  Eyes: Negative for pain, redness and visual disturbance  Respiratory: Negative for cough, shortness of breath and wheezing  Cardiovascular: Negative for chest pain, palpitations and leg swelling  Gastrointestinal: Negative for abdominal pain, nausea and vomiting     Endocrine: Negative for polydipsia and polyuria  Genitourinary: Negative for dyspareunia and hematuria  Musculoskeletal: Positive for myalgias  Negative for arthralgias and joint swelling  Skin: Negative for rash and wound  Neurological: Negative for dizziness, numbness and headaches  Psychiatric/Behavioral: Negative for decreased concentration and suicidal ideas  The patient is not nervous/anxious  VITALS:  Vitals:    06/09/22 0727   BP: 113/77   Pulse: 81   Resp: 17       LABS:  HgA1c:   Lab Results   Component Value Date    HGBA1C 5 6 08/14/2018     BMP:   Lab Results   Component Value Date    CALCIUM 9 1 10/02/2020    K 4 6 10/02/2020    CO2 33 (H) 10/02/2020     (H) 10/02/2020    BUN 20 10/02/2020    CREATININE 0 85 10/02/2020       _____________________________________________________  PHYSICAL EXAMINATION:  General: well developed and well nourished, alert, oriented times 3 and appears comfortable  Psychiatric: Normal  HEENT: Normocephalic, Atraumatic Trachea Midline, No torticollis  Pulmonary: No audible wheezing or respiratory distress   Cardiovascular: No pitting edema, 2+ radial pulse   Abdominal/GI: abdomen non tender, non distended   Skin: No masses, erythema, lacerations, fluctation, ulcerations  Neurovascular: Sensation Intact to the Median, Ulnar, Radial Nerve, Motor Intact to the Median, Ulnar, Radial Nerve and Pulses Intact  Musculoskeletal: Normal, except as noted in detailed exam and in HPI  MUSCULOSKELETAL EXAMINATION:  Right elbow:  Skin intact  No erythema ecchymosis noted  No swelling noted  Tender to palpate distal triceps tendon  Nontender at the lateral epicondyle  Full range of motion  Sensation intact  Brisk capillary refill noted to all digits      ___________________________________________________  STUDIES REVIEWED:  None today        PROCEDURES PERFORMED:  Procedures  No Procedures performed today    _____________________________________________________      [facility] Attestation    I,:  Petr Marino am acting as a scribe while in the presence of the attending physician :       I,:  Diana Tadeo MD personally performed the services described in this documentation    as scribed in my presence :

## 2022-06-09 NOTE — LETTER
June 9, 2022     Patient: Misael Shetty  YOB: 1980  Date of Visit: 6/9/2022      To Whom it May Concern:    Misael Shetty is under my professional care  Loni Mcmanus was seen in my office on 6/9/2022  Loni Mcmanus may return to work on 6/10/22  If you have any questions or concerns, please don't hesitate to call           Sincerely,          Seymour Patterson MD        CC: No Recipients

## 2022-10-18 ENCOUNTER — TRANSCRIBE ORDERS (OUTPATIENT)
Dept: LAB | Facility: CLINIC | Age: 42
End: 2022-10-18

## 2022-10-18 ENCOUNTER — APPOINTMENT (OUTPATIENT)
Dept: LAB | Facility: CLINIC | Age: 42
End: 2022-10-18
Payer: COMMERCIAL

## 2022-10-18 DIAGNOSIS — J45.909 ASTHMATIC BRONCHITIS WITHOUT COMPLICATION, UNSPECIFIED ASTHMA SEVERITY, UNSPECIFIED WHETHER PERSISTENT: ICD-10-CM

## 2022-10-18 DIAGNOSIS — J45.909 ASTHMATIC BRONCHITIS WITHOUT COMPLICATION, UNSPECIFIED ASTHMA SEVERITY, UNSPECIFIED WHETHER PERSISTENT: Primary | ICD-10-CM

## 2022-10-18 LAB
ALBUMIN SERPL BCP-MCNC: 3.6 G/DL (ref 3.5–5)
ALP SERPL-CCNC: 94 U/L (ref 46–116)
ALT SERPL W P-5'-P-CCNC: 21 U/L (ref 12–78)
ANION GAP SERPL CALCULATED.3IONS-SCNC: 5 MMOL/L (ref 4–13)
AST SERPL W P-5'-P-CCNC: 8 U/L (ref 5–45)
BASOPHILS # BLD AUTO: 0.06 THOUSANDS/ΜL (ref 0–0.1)
BASOPHILS NFR BLD AUTO: 1 % (ref 0–1)
BILIRUB SERPL-MCNC: 0.27 MG/DL (ref 0.2–1)
BUN SERPL-MCNC: 15 MG/DL (ref 5–25)
CALCIUM SERPL-MCNC: 8.8 MG/DL (ref 8.3–10.1)
CHLORIDE SERPL-SCNC: 108 MMOL/L (ref 96–108)
CHOLEST SERPL-MCNC: 178 MG/DL
CO2 SERPL-SCNC: 26 MMOL/L (ref 21–32)
CREAT SERPL-MCNC: 0.81 MG/DL (ref 0.6–1.3)
EOSINOPHIL # BLD AUTO: 0.19 THOUSAND/ΜL (ref 0–0.61)
EOSINOPHIL NFR BLD AUTO: 2 % (ref 0–6)
ERYTHROCYTE [DISTWIDTH] IN BLOOD BY AUTOMATED COUNT: 14 % (ref 11.6–15.1)
GFR SERPL CREATININE-BSD FRML MDRD: 89 ML/MIN/1.73SQ M
GLUCOSE P FAST SERPL-MCNC: 100 MG/DL (ref 65–99)
HCT VFR BLD AUTO: 43 % (ref 34.8–46.1)
HDLC SERPL-MCNC: 37 MG/DL
HGB BLD-MCNC: 14.2 G/DL (ref 11.5–15.4)
IMM GRANULOCYTES # BLD AUTO: 0.05 THOUSAND/UL (ref 0–0.2)
IMM GRANULOCYTES NFR BLD AUTO: 0 % (ref 0–2)
LDLC SERPL CALC-MCNC: 127 MG/DL (ref 0–100)
LYMPHOCYTES # BLD AUTO: 3.78 THOUSANDS/ΜL (ref 0.6–4.47)
LYMPHOCYTES NFR BLD AUTO: 33 % (ref 14–44)
MCH RBC QN AUTO: 30.1 PG (ref 26.8–34.3)
MCHC RBC AUTO-ENTMCNC: 33 G/DL (ref 31.4–37.4)
MCV RBC AUTO: 91 FL (ref 82–98)
MONOCYTES # BLD AUTO: 0.62 THOUSAND/ΜL (ref 0.17–1.22)
MONOCYTES NFR BLD AUTO: 5 % (ref 4–12)
NEUTROPHILS # BLD AUTO: 6.74 THOUSANDS/ΜL (ref 1.85–7.62)
NEUTS SEG NFR BLD AUTO: 59 % (ref 43–75)
NONHDLC SERPL-MCNC: 141 MG/DL
NRBC BLD AUTO-RTO: 0 /100 WBCS
PLATELET # BLD AUTO: 284 THOUSANDS/UL (ref 149–390)
PMV BLD AUTO: 11.2 FL (ref 8.9–12.7)
POTASSIUM SERPL-SCNC: 4.4 MMOL/L (ref 3.5–5.3)
PROT SERPL-MCNC: 7 G/DL (ref 6.4–8.4)
RBC # BLD AUTO: 4.72 MILLION/UL (ref 3.81–5.12)
SODIUM SERPL-SCNC: 139 MMOL/L (ref 135–147)
TRIGL SERPL-MCNC: 68 MG/DL
TSH SERPL DL<=0.05 MIU/L-ACNC: 1.57 UIU/ML (ref 0.45–4.5)
WBC # BLD AUTO: 11.44 THOUSAND/UL (ref 4.31–10.16)

## 2022-10-18 PROCEDURE — 80061 LIPID PANEL: CPT

## 2022-10-18 PROCEDURE — 36415 COLL VENOUS BLD VENIPUNCTURE: CPT

## 2022-10-18 PROCEDURE — 85025 COMPLETE CBC W/AUTO DIFF WBC: CPT

## 2022-10-18 PROCEDURE — 80053 COMPREHEN METABOLIC PANEL: CPT

## 2022-10-18 PROCEDURE — 84443 ASSAY THYROID STIM HORMONE: CPT

## 2023-02-28 ENCOUNTER — CONSULT (OUTPATIENT)
Dept: SURGERY | Facility: CLINIC | Age: 43
End: 2023-02-28

## 2023-02-28 VITALS
HEIGHT: 65 IN | SYSTOLIC BLOOD PRESSURE: 137 MMHG | TEMPERATURE: 97.6 F | DIASTOLIC BLOOD PRESSURE: 84 MMHG | HEART RATE: 73 BPM | WEIGHT: 269.4 LBS | BODY MASS INDEX: 44.89 KG/M2

## 2023-02-28 DIAGNOSIS — N63.42 SUBAREOLAR MASS OF LEFT BREAST: ICD-10-CM

## 2023-02-28 DIAGNOSIS — Z12.31 SCREENING MAMMOGRAM FOR BREAST CANCER: Primary | ICD-10-CM

## 2023-02-28 NOTE — ASSESSMENT & PLAN NOTE
There is nothing present at this time that I can appreciate  She has never had any mammograms  We will start her screening mammograms  Give her a call with those results otherwise no worrisome findings today

## 2023-02-28 NOTE — PROGRESS NOTES
Office Visit - General Surgery  Phuong Lee MRN: [id number]  Encounter: [id number]    Assessment and Plan  Problem List Items Addressed This Visit        Other    Subareolar mass of left breast     There is nothing present at this time that I can appreciate  She has never had any mammograms  We will start her screening mammograms  Give her a call with those results otherwise no worrisome findings today  Other Visit Diagnoses     Screening mammogram for breast cancer    -  Primary    Relevant Orders    Mammo screening bilateral w 3d & cad          Chief Complaint:  Phuong Lee is a 43 y o  female who presents for Abscess (Consult left breast abcess )    Subjective  60-year-old female who had noted a lump in her left breast and opened and drained  Since mostly resolved  She has family history of a maternal aunt with breast cancer  Otherwise only breast complaint she occasionally has some milky type drainage previously had a lump where it drained but nothing else      Past Medical History:   Diagnosis Date   • Abnormal Papanicolaou smear of cervix    • Asthma    • Endometriosis 02/06/2020   • Kidney stones    • Left breast lump    • Papanicolaou smear for cervical cancer screening 2016       Past Surgical History:   Procedure Laterality Date   • CHOLECYSTECTOMY     • TUBAL LIGATION         Family History   Problem Relation Age of Onset   • Diabetes Mother    • COPD Mother    • Other Mother         Multiple breast cysts    • Diabetes Father    • Heart disease Father    • Heart failure Father    • Breast cancer Maternal Aunt        Social History     Tobacco Use   • Smoking status: Every Day     Packs/day: 0 50     Years: 29 00     Pack years: 14 50     Types: Cigarettes   • Smokeless tobacco: Current   Vaping Use   • Vaping Use: Never used   Substance Use Topics   • Alcohol use: No   • Drug use: Yes     Types: Marijuana     Comment: last used: 08/07/21        Medications  Current Outpatient Medications on File Prior to Visit   Medication Sig Dispense Refill   • buPROPion (WELLBUTRIN XL) 150 mg 24 hr tablet Take 150 mg by mouth daily     • ibuprofen (MOTRIN) 600 mg tablet Take 1 tablet (600 mg total) by mouth every 8 (eight) hours as needed for mild pain for up to 28 days 90 tablet 0   • meloxicam (Mobic) 15 mg tablet Take 1 tablet (15 mg total) by mouth daily (Patient not taking: Reported on 12/8/2021) 30 tablet 1   • methylPREDNISolone 4 MG tablet therapy pack Use as directed on package (Patient not taking: Reported on 12/8/2021) 1 each 1   • methylPREDNISolone 4 MG tablet therapy pack Use as directed on package (Patient not taking: Reported on 2/28/2023) 1 each 1   • Orilissa 150 MG TABS Take 1 tablet by mouth daily (Patient not taking: Reported on 2/28/2023) 90 tablet 1     No current facility-administered medications on file prior to visit  Allergies  Allergies   Allergen Reactions   • Erythromycin Hives   • Other      Bee sting       Review of Systems    Objective  Vitals:    02/28/23 0926   BP: 137/84   Pulse: 73   Temp: 97 6 °F (36 4 °C)       Physical Exam   Neck is supple without adenopathy or thyroid mass, no supraclavicular adenopathy  Breast exam: Right breast no discrete masses no axillary adenopathy  Left breast: At 930 o'clock position at the edge of the areola is some sloughing dry skin otherwise there is no discrete or worrisome masses in the breast no axillary adenopathy

## 2023-03-13 ENCOUNTER — HOSPITAL ENCOUNTER (OUTPATIENT)
Dept: RADIOLOGY | Facility: IMAGING CENTER | Age: 43
Discharge: HOME | End: 2023-03-13

## 2023-03-13 VITALS — WEIGHT: 266 LBS | HEIGHT: 66 IN | BODY MASS INDEX: 42.75 KG/M2

## 2023-03-13 DIAGNOSIS — Z12.31 SCREENING MAMMOGRAM FOR BREAST CANCER: ICD-10-CM

## 2023-08-13 ENCOUNTER — HOSPITAL ENCOUNTER (EMERGENCY)
Facility: HOSPITAL | Age: 43
Discharge: HOME | End: 2023-08-13
Attending: EMERGENCY MEDICINE
Payer: COMMERCIAL

## 2023-08-13 DIAGNOSIS — R11.2 NAUSEA & VOMITING: ICD-10-CM

## 2023-08-13 DIAGNOSIS — N39.0 UTI (URINARY TRACT INFECTION): Primary | ICD-10-CM

## 2023-08-13 DIAGNOSIS — R50.9 FEVER: ICD-10-CM

## 2023-08-13 LAB
BACTERIA UR QL AUTO: ABNORMAL /HPF
BILIRUB UR QL STRIP: NEGATIVE
CLARITY UR: CLEAR
COLOR UR: YELLOW
EXT PREGNANCY TEST URINE: NEGATIVE
EXT. CONTROL: NORMAL
FLUAV RNA RESP QL NAA+PROBE: NEGATIVE
FLUBV RNA RESP QL NAA+PROBE: NEGATIVE
GLUCOSE UR STRIP-MCNC: NEGATIVE MG/DL
HGB UR QL STRIP.AUTO: ABNORMAL
KETONES UR STRIP-MCNC: ABNORMAL MG/DL
LEUKOCYTE ESTERASE UR QL STRIP: ABNORMAL
MUCOUS THREADS UR QL AUTO: ABNORMAL
NITRITE UR QL STRIP: POSITIVE
NON-SQ EPI CELLS URNS QL MICRO: ABNORMAL /HPF
PH UR STRIP.AUTO: 6 [PH] (ref 4.5–8)
PROT UR STRIP-MCNC: ABNORMAL MG/DL
RBC #/AREA URNS AUTO: ABNORMAL /HPF
RSV RNA RESP QL NAA+PROBE: NEGATIVE
SARS-COV-2 RNA RESP QL NAA+PROBE: NEGATIVE
SP GR UR STRIP.AUTO: 1.02 (ref 1–1.03)
UROBILINOGEN UR QL STRIP.AUTO: 2 E.U./DL
WBC #/AREA URNS AUTO: ABNORMAL /HPF

## 2023-08-13 PROCEDURE — 81001 URINALYSIS AUTO W/SCOPE: CPT

## 2023-08-13 PROCEDURE — 99283 EMERGENCY DEPT VISIT LOW MDM: CPT

## 2023-08-13 PROCEDURE — 87077 CULTURE AEROBIC IDENTIFY: CPT

## 2023-08-13 PROCEDURE — 81025 URINE PREGNANCY TEST: CPT | Performed by: EMERGENCY MEDICINE

## 2023-08-13 PROCEDURE — 0241U HB NFCT DS VIR RESP RNA 4 TRGT: CPT

## 2023-08-13 PROCEDURE — 87086 URINE CULTURE/COLONY COUNT: CPT

## 2023-08-13 PROCEDURE — 99284 EMERGENCY DEPT VISIT MOD MDM: CPT | Performed by: EMERGENCY MEDICINE

## 2023-08-13 PROCEDURE — 87186 SC STD MICRODIL/AGAR DIL: CPT

## 2023-08-13 RX ORDER — IBUPROFEN 600 MG/1
600 TABLET ORAL ONCE
Status: COMPLETED | OUTPATIENT
Start: 2023-08-13 | End: 2023-08-13

## 2023-08-13 RX ORDER — CEPHALEXIN 500 MG/1
500 CAPSULE ORAL EVERY 12 HOURS SCHEDULED
Qty: 14 CAPSULE | Refills: 0 | Status: SHIPPED | OUTPATIENT
Start: 2023-08-13 | End: 2023-08-20

## 2023-08-13 RX ORDER — ACETAMINOPHEN 325 MG/1
975 TABLET ORAL ONCE
Status: COMPLETED | OUTPATIENT
Start: 2023-08-13 | End: 2023-08-13

## 2023-08-13 RX ORDER — CEPHALEXIN 500 MG/1
500 CAPSULE ORAL ONCE
Status: DISCONTINUED | OUTPATIENT
Start: 2023-08-13 | End: 2023-08-14 | Stop reason: HOSPADM

## 2023-08-13 RX ORDER — ONDANSETRON 4 MG/1
4 TABLET, ORALLY DISINTEGRATING ORAL ONCE
Status: COMPLETED | OUTPATIENT
Start: 2023-08-13 | End: 2023-08-13

## 2023-08-13 RX ADMIN — IBUPROFEN 600 MG: 600 TABLET, FILM COATED ORAL at 20:35

## 2023-08-13 RX ADMIN — ACETAMINOPHEN 975 MG: 325 TABLET, FILM COATED ORAL at 20:35

## 2023-08-13 RX ADMIN — ONDANSETRON 4 MG: 4 TABLET, ORALLY DISINTEGRATING ORAL at 21:17

## 2023-08-14 VITALS
RESPIRATION RATE: 20 BRPM | DIASTOLIC BLOOD PRESSURE: 91 MMHG | HEART RATE: 90 BPM | SYSTOLIC BLOOD PRESSURE: 154 MMHG | OXYGEN SATURATION: 97 % | TEMPERATURE: 99.4 F

## 2023-08-14 NOTE — DISCHARGE INSTRUCTIONS
You were seen in the Emergency Department today for fever, nausea, vomiting. Please follow up with your primary care doctor in 2-3 days for re-evaluation. Please return to the Emergency Department if you experience worsening of your current symptoms or any other concerning symptoms, including continued fever despite Tylenol/Motrin, uncontrolled vomiting, abdominal pain.

## 2023-08-14 NOTE — ED ATTENDING ATTESTATION
8/13/2023  I, Karolina Malloy MD, saw and evaluated the patient. I have discussed the patient with the resident/non-physician practitioner and agree with the resident's/non-physician practitioner's findings, Plan of Care, and MDM as documented in the resident's/non-physician practitioner's note, except where noted. All available labs and Radiology studies were reviewed. I was present for key portions of any procedure(s) performed by the resident/non-physician practitioner and I was immediately available to provide assistance. At this point I agree with the current assessment done in the Emergency Department. I have conducted an independent evaluation of this patient a history and physical is as follows:   She presents with a fever and  Congestion with episodes of nausea vomiting and diarrhea the diarrhea is nonbloody vomiting is nonbilious nonbloody and she has no abdominal pain does complain of some discomfort when she urinates however she does not have any back pain no travel history no recent antibiotic use there was a sick contact at work  States she took a COVID test recently and it was negative  Complains of shortness of breath no headache  Exam heart rate 90 rate normal temperature is 101.4 no acute distress HEENT exam is unremarkable  Neck supple lungs clear heart regular no murmurs abdomen soft nontender  Impression viral illness with fever  Symptomatic treatment    ED Course         Critical Care Time  Procedures

## 2023-08-14 NOTE — ED PROVIDER NOTES
History  Chief Complaint   Patient presents with   • Medical Problem     Pt states she had flu like symptoms about two weeks ago including N/V/diarrhea , [place] which then she believes she got sun poisoning and she started feeling N/V/diarrhea/chills states its its hard for her to keep food down      HPI    66-year-old female with past medical history significant for endometriosis, cholecystectomy, tubal ligation presents the ED for evaluation of fever, nausea, vomiting, diarrhea. Patient states that her symptoms started about 3 days ago. Denies any blood in her emesis or diarrhea. Reports that her diarrhea has resolved as of today. Patient denies cough, congestion, sore throat, chest pain, shortness of breath, abdominal pain. Patient states that her boss had similar symptoms at work a week ago. She took a COVID test recently and it was negative. She does endorse some dysuria intermittently but states that it is normal for her because of history of endometriosis. Denies hematuria. Denies any recent antibiotic use, recent travel, recent hospitalization. Prior to Admission Medications   Prescriptions Last Dose Informant Patient Reported? Taking?    Orilissa 150 MG TABS  Self No No   Sig: Take 1 tablet by mouth daily   Patient not taking: Reported on 2/28/2023   buPROPion (WELLBUTRIN XL) 150 mg 24 hr tablet  Self Yes No   Sig: Take 150 mg by mouth daily   ibuprofen (MOTRIN) 600 mg tablet   No No   Sig: Take 1 tablet (600 mg total) by mouth every 8 (eight) hours as needed for mild pain for up to 28 days   meloxicam (Mobic) 15 mg tablet  Self No No   Sig: Take 1 tablet (15 mg total) by mouth daily   Patient not taking: Reported on 12/8/2021   methylPREDNISolone 4 MG tablet therapy pack  Self No No   Sig: Use as directed on package   Patient not taking: Reported on 12/8/2021   methylPREDNISolone 4 MG tablet therapy pack   No No   Sig: Use as directed on package   Patient not taking: Reported on 2/28/2023      Facility-Administered Medications: None       Past Medical History:   Diagnosis Date   • Abnormal Papanicolaou smear of cervix    • Asthma    • Endometriosis 02/06/2020   • Kidney stones    • Left breast lump    • Papanicolaou smear for cervical cancer screening 2016       Past Surgical History:   Procedure Laterality Date   • CHOLECYSTECTOMY     • TUBAL LIGATION         Family History   Problem Relation Age of Onset   • Diabetes Mother    • COPD Mother    • Other Mother         Multiple breast cysts    • Diabetes Father    • Heart disease Father    • Heart failure Father    • No Known Problems Daughter    • No Known Problems Maternal Grandmother    • No Known Problems Maternal Grandfather    • No Known Problems Paternal Grandmother    • No Known Problems Paternal Grandfather    • Breast cancer Maternal Aunt    • Breast cancer Maternal Aunt      I have reviewed and agree with the history as documented. E-Cigarette/Vaping   • E-Cigarette Use Never User      E-Cigarette/Vaping Substances   • Nicotine No    • THC No    • CBD No    • Flavoring No    • Other No    • Unknown No      Social History     Tobacco Use   • Smoking status: Every Day     Packs/day: 0.50     Years: 29.00     Total pack years: 14.50     Types: Cigarettes   • Smokeless tobacco: Current   Vaping Use   • Vaping Use: Never used   Substance Use Topics   • Alcohol use: No   • Drug use: Yes     Types: Marijuana     Comment: last used: 08/07/21        Review of Systems   Constitutional: Positive for fever. Negative for chills. HENT: Negative for congestion, ear pain, rhinorrhea and sore throat. Eyes: Negative for pain and visual disturbance. Respiratory: Negative for cough and shortness of breath. Cardiovascular: Negative for chest pain and palpitations. Gastrointestinal: Positive for diarrhea, nausea and vomiting. Negative for abdominal pain and blood in stool. Genitourinary: Positive for dysuria. Negative for hematuria. Musculoskeletal: Negative for arthralgias and back pain. Skin: Negative for color change and rash. Neurological: Negative for dizziness, seizures, syncope, light-headedness and headaches. All other systems reviewed and are negative. Physical Exam  ED Triage Vitals [08/13/23 1957]   Temperature Pulse Respirations Blood Pressure SpO2   (!) 101.4 °F (38.6 °C) (!) 114 20 154/91 97 %      Temp Source Heart Rate Source Patient Position - Orthostatic VS BP Location FiO2 (%)   Oral Monitor Sitting Right arm --      Pain Score       6             Orthostatic Vital Signs  Vitals:    08/13/23 1957 08/13/23 2300   BP: 154/91    Pulse: (!) 114 90   Patient Position - Orthostatic VS: Sitting        Physical Exam  Vitals and nursing note reviewed. Constitutional:       General: She is not in acute distress. Appearance: Normal appearance. She is well-developed. She is not ill-appearing. HENT:      Head: Normocephalic and atraumatic. Mouth/Throat:      Mouth: Mucous membranes are moist.      Pharynx: No oropharyngeal exudate. Eyes:      Conjunctiva/sclera: Conjunctivae normal.   Cardiovascular:      Rate and Rhythm: Normal rate and regular rhythm. Pulses: Normal pulses. Heart sounds: Normal heart sounds. No murmur heard. Pulmonary:      Effort: Pulmonary effort is normal. No respiratory distress. Breath sounds: Normal breath sounds. No stridor. No wheezing, rhonchi or rales. Abdominal:      Palpations: Abdomen is soft. Tenderness: There is no abdominal tenderness. There is no right CVA tenderness, left CVA tenderness, guarding or rebound. Musculoskeletal:         General: No swelling. Cervical back: Neck supple. Skin:     General: Skin is warm and dry. Capillary Refill: Capillary refill takes less than 2 seconds. Neurological:      General: No focal deficit present. Mental Status: She is alert and oriented to person, place, and time. Psychiatric:         Mood and Affect: Mood normal.         ED Medications  Medications   acetaminophen (TYLENOL) tablet 975 mg (975 mg Oral Given 8/13/23 2035)   ibuprofen (MOTRIN) tablet 600 mg (600 mg Oral Given 8/13/23 2035)   ondansetron (ZOFRAN-ODT) dispersible tablet 4 mg (4 mg Oral Given 8/13/23 2117)       Diagnostic Studies  Results Reviewed     Procedure Component Value Units Date/Time    COVID/FLU/RSV [[id number]]  (Normal) Collected: 08/13/23 2134    Lab Status: Final result Specimen: Nares from Nose Updated: 08/13/23 2228     SARS-CoV-2 Negative     INFLUENZA A PCR Negative     INFLUENZA B PCR Negative     RSV PCR Negative    Narrative:      FOR PEDIATRIC PATIENTS - copy/paste COVID Guidelines URL to browser: [email or web address]    SARS-CoV-2 assay is a Nucleic Acid Amplification assay intended for the  qualitative detection of nucleic acid from SARS-CoV-2 in nasopharyngeal  swabs. Results are for the presumptive identification of SARS-CoV-2 RNA. Positive results are indicative of infection with SARS-CoV-2, the virus  causing COVID-19, but do not rule out bacterial infection or co-infection  with other viruses. Laboratories within the [facility] and its  territories are required to report all positive results to the appropriate  public health authorities. Negative results do not preclude SARS-CoV-2  infection and should not be used as the sole basis for treatment or other  patient management decisions. Negative results must be combined with  clinical observations, patient history, and epidemiological information. This test has not been FDA cleared or approved. This test has been authorized by FDA under an Emergency Use Authorization  (EUA).  This test is only authorized for the duration of time the  declaration that circumstances exist justifying the authorization of the  emergency use of an in vitro diagnostic tests for detection of SARS-CoV-2  virus and/or diagnosis of COVID-19 infection under section 564(b)(1) of  the Act, 21 U. S.C. 638QAL-9(X)(1), unless the authorization is terminated  or revoked sooner. The test has been validated but independent review by FDA  and CLIA is pending. Test performed using [employer] GeneXpert: This RT-PCR assay targets N2,  a region unique to SARS-CoV-2. A conserved region in the E-gene was chosen  for pan-Sarbecovirus detection which includes SARS-CoV-2. According to CMS-2020-01-R, this platform meets the definition of high-throughput technology. Urine Microscopic [[id number]]  (Abnormal) Collected: 08/13/23 2121    Lab Status: Final result Specimen: Urine, Clean Catch Updated: 08/13/23 2224     RBC, UA 10-20 /hpf      WBC, UA Innumerable /hpf      Epithelial Cells Moderate /hpf      Bacteria, UA Moderate /hpf      MUCUS THREADS Moderate    Urine culture [[id number]] Collected: 08/13/23 2121    Lab Status:  In process Specimen: Urine, Clean Catch Updated: 08/13/23 2224    POCT pregnancy, urine [[id number]]  (Normal) Resulted: 08/13/23 2207    Lab Status: Final result Updated: 08/13/23 2207     EXT Preg Test, Ur Negative     Control Valid    Urine Macroscopic, POC [[id number]]  (Abnormal) Collected: 08/13/23 2121    Lab Status: Final result Specimen: Urine Updated: 08/13/23 2122     Color, UA Yellow     Clarity, UA Clear     pH, UA 6.0     Leukocytes, UA Small     Nitrite, UA Positive     Protein,  (2+) mg/dl      Glucose, UA Negative mg/dl      Ketones, UA 15 (1+) mg/dl      Urobilinogen, UA 2.0 E.U./dl      Bilirubin, UA Negative     Occult Blood, UA Moderate     Specific Gravity, UA 1.025    Narrative:      CLINITEK RESULT                 No orders to display         Procedures  Procedures      ED Course  ED Course as of 08/14/23 0151   Sun Aug 13, 2023   2026 Blood Pressure: 154/91   2026 Temperature(!): 101.4 °F (38.6 °C)   2026 Temp Source: Oral   2026 Pulse(!): 114   2026 Respirations: 20   2026 SpO2: 97 %   2122 Leukocytes, UA(!): Small   2123 Nitrite, UA(!): Positive   2123 Ketones, UA(!): 15 (1+)   2123 Blood, UA(!): Moderate   2219 PREGNANCY TEST URINE: Negative   2219 Leukocytes, UA(!): Small   2219 Nitrite, UA(!): Positive   2236 WBC, UA(!): Innumerable   2236 Bacteria, UA(!): Moderate   2236 SARS-COV-2: Negative   2236 INFLU A PCR: Negative   2236 INFLU B PCR: Negative   2236 RSV PCR: Negative                                       Medical Decision Making  Amount and/or Complexity of Data Reviewed  Labs: ordered. Decision-making details documented in ED Course. Risk  OTC drugs. Prescription drug management. 41-year-old female with past medical history significant for endometriosis, cholecystectomy, tubal ligation presents to the ED for evaluation of fever, nausea, vomiting, diarrhea. Febrile temp of 101.4 °F.  Tachycardic to 114, appears appropriate secondary to febrile illness. Benign physical exam.     DDx: viral illness, UTI    Negative COVID/flu/RSV. Urinalysis shows small leukocytes, positive nitrite, innumerable white blood cells, moderate bacteria. Will treat UTI with Keflex. Able for discharge. Strict return ED precautions provided. Advised patient to follow-up with her PCP within 2 to 3 days for reevaluation. Patient verbalized understanding and agrees with the plan of care.       Disposition  Final diagnoses:   UTI (urinary tract infection)   Nausea & vomiting   Fever     Time reflects when diagnosis was documented in both MDM as applicable and the Disposition within this note     Time User Action Codes Description Comment    8/13/2023 10:36 PM Christiano WEAVER Add [N39.0] UTI (urinary tract infection)     8/13/2023 10:37 PM Christiano WEAVER Add [R11.2] Nausea & vomiting     8/13/2023 10:37 PM Christiano WEAVER Add [R50.9] Fever       ED Disposition     ED Disposition   Discharge    Condition   Stable    Date/Time   Sun Aug 13, 2023 10:38 PM    Comment   Monet Schneider discharge to home/self care. Follow-up Information     Follow up With Specialties Details Why Contact Info Additional Information    Beltran Emerson MD Internal Medicine   [address] [facility] [address] [facility] [place]  [address] [address]  [address] Emergency Department Emergency Medicine  If symptoms worsen [address] [address]  [facility] Emergency Department, [address] [facility], [place], [place], [facility]          Discharge Medication List as of 8/13/2023 10:41 PM      START taking these medications    Details   cephalexin (KEFLEX) 500 mg capsule Take 1 capsule (500 mg total) by mouth every 12 (twelve) hours for 7 days, Starting Sun 8/13/2023, Until Sun 8/20/2023, Normal         CONTINUE these medications which have NOT CHANGED    Details   buPROPion (WELLBUTRIN XL) 150 mg 24 hr tablet Take 150 mg by mouth daily, Historical Med      ibuprofen (MOTRIN) 600 mg tablet Take 1 tablet (600 mg total) by mouth every 8 (eight) hours as needed for mild pain for up to 28 days, Starting Thu 6/9/2022, Until Thu 7/7/2022 at 2359, Normal      meloxicam (Mobic) 15 mg tablet Take 1 tablet (15 mg total) by mouth daily, Starting Fri 10/22/2021, Normal      !! methylPREDNISolone 4 MG tablet therapy pack Use as directed on package, Normal      !! methylPREDNISolone 4 MG tablet therapy pack Use as directed on package, Normal      Orilissa 150 MG TABS Take 1 tablet by mouth daily, Starting Wed 3/24/2021, Normal       !! - Potential duplicate medications found. Please discuss with provider. No discharge procedures on file. PDMP Review     None           ED Provider  Attending physically available and evaluated French Alexandre. I managed the patient along with the ED Attending.     Electronically Signed by         Kim Lebron MD  08/14/23 0158

## 2023-08-16 LAB
BACTERIA UR CULT: ABNORMAL
BACTERIA UR CULT: ABNORMAL

## 2023-12-01 ENCOUNTER — OFFICE VISIT (OUTPATIENT)
Age: 43
End: 2023-12-01
Payer: COMMERCIAL

## 2023-12-01 VITALS
RESPIRATION RATE: 18 BRPM | TEMPERATURE: 97.3 F | BODY MASS INDEX: 41.88 KG/M2 | DIASTOLIC BLOOD PRESSURE: 80 MMHG | OXYGEN SATURATION: 97 % | HEIGHT: 66 IN | SYSTOLIC BLOOD PRESSURE: 116 MMHG | HEART RATE: 66 BPM | WEIGHT: 260.58 LBS

## 2023-12-01 DIAGNOSIS — L30.9 DERMATITIS: Primary | ICD-10-CM

## 2023-12-01 PROCEDURE — 99203 OFFICE O/P NEW LOW 30 MIN: CPT | Performed by: EMERGENCY MEDICINE

## 2023-12-01 RX ORDER — PREDNISONE 10 MG/1
TABLET ORAL
Qty: 27 TABLET | Refills: 0 | Status: SHIPPED | OUTPATIENT
Start: 2023-12-01

## 2023-12-01 NOTE — PATIENT INSTRUCTIONS
Dermatitis   WHAT YOU NEED TO KNOW:   Dermatitis is skin inflammation. You may have an itchy rash, redness, or swelling. You may also have bumps or blisters that crust over or ooze clear fluid. Dermatitis can be caused by allergens such as dust mites, pet dander, pollen, and certain foods. It can also develop when something touches your skin and irritates it or causes an allergic reaction. Examples include soaps, chemicals, latex, and poison ivy. DISCHARGE INSTRUCTIONS:   Call your local emergency number (911 in the [address]) or have someone call if:   You have symptoms of anaphylaxis, such as sudden trouble breathing, throat swelling, or feeling dizzy or lightheaded. Return to the emergency department if:   You develop a fever or have red streaks going up your arm or leg. Your rash gets more swollen, red, or hot. Call your doctor or dermatologist if:   Your skin blisters, oozes white or yellow pus, or has a foul-smelling discharge. Your rash spreads or does not get better, even after treatment. You have questions or concerns about your condition or care. Medicines:   Medicines  help decrease itching and inflammation, or treat a bacterial infection. They may be given as a topical cream, shot, or a pill. Take your medicine as directed. Contact your healthcare provider if you think your medicine is not helping or if you have side effects. Tell your provider if you are allergic to any medicine. Keep a list of the medicines, vitamins, and herbs you take. Include the amounts, and when and why you take them. Bring the list or the pill bottles to follow-up visits. Carry your medicine list with you in case of an emergency. Manage dermatitis:   Apply a cool compress to your rash. This will help soothe your skin. Apply lotions or creams to the area. These help keep your skin moist and decrease itching. Apply the lotion or cream right after a lukewarm bath or shower when your skin is still damp.  Use products that do not contain dye or a scent. Avoid skin irritants. Examples include makeup, hair products, soaps, and cleansers. Use products that do not contain a scent or dye. Follow up with your doctor or dermatologist as directed:  Write down your questions so you remember to ask them during your visits. © Copyright [employer] 2023 Information is for End User's use only and may not be sold, redistributed or otherwise used for commercial purposes. The above information is an  only. It is not intended as medical advice for individual conditions or treatments. Talk to your doctor, nurse or pharmacist before following any medical regimen to see if it is safe and effective for you. Fluid In The Ear (Serous Otitis Media)   WHAT YOU NEED TO KNOW:   MACY is fluid trapped in the middle of your ear behind your eardrum. This condition usually develops without signs or symptoms of an ear infection. Serous otitis media may be caused by an upper respiratory infection or allergies. It is most common in the fall and early spring. DISCHARGE INSTRUCTIONS:   Call your doctor if:   You develop severe ear pain. The outside of your ear is red or swollen. You have fluid coming from your ear. You have questions or concerns about your condition or care. How to stay healthy:   Wash your hands often throughout the day. Use soap and water. Rub your soapy hands together, lacing your fingers, for at least 20 seconds. Rinse with warm, running water. Dry your hands with a clean towel or paper towel. Use hand  that contains alcohol if soap and water are not available. Teach children how to wash their hands and use hand . Avoid people who are sick. Some germs are easily and quickly spread through contact. Follow up with your doctor as directed:  Write down your questions so you remember to ask them during your visits.    © Copyright [employer] 2023 Information is for End User's use only and may not be sold, redistributed or otherwise used for commercial purposes. The above information is an  only. It is not intended as medical advice for individual conditions or treatments. Talk to your doctor, nurse or pharmacist before following any medical regimen to see if it is safe and effective for you.

## 2023-12-01 NOTE — PROGRESS NOTES
[facility]        NAME: Manjula Bartholomew is a 37 y.o. female  : 1980    MRN: [id number]  DATE: 2023  TIME: 12:11 PM    Assessment and Plan   Dermatitis [L30.9]  1. Dermatitis  predniSONE 10 mg tablet            Patient Instructions     Patient Instructions   Dermatitis   WHAT YOU NEED TO KNOW:   Dermatitis is skin inflammation. You may have an itchy rash, redness, or swelling. You may also have bumps or blisters that crust over or ooze clear fluid. Dermatitis can be caused by allergens such as dust mites, pet dander, pollen, and certain foods. It can also develop when something touches your skin and irritates it or causes an allergic reaction. Examples include soaps, chemicals, latex, and poison ivy. DISCHARGE INSTRUCTIONS:   Call your local emergency number (911 in the [address]) or have someone call if:   You have symptoms of anaphylaxis, such as sudden trouble breathing, throat swelling, or feeling dizzy or lightheaded. Return to the emergency department if:   You develop a fever or have red streaks going up your arm or leg. Your rash gets more swollen, red, or hot. Call your doctor or dermatologist if:   Your skin blisters, oozes white or yellow pus, or has a foul-smelling discharge. Your rash spreads or does not get better, even after treatment. You have questions or concerns about your condition or care. Medicines:   Medicines  help decrease itching and inflammation, or treat a bacterial infection. They may be given as a topical cream, shot, or a pill. Take your medicine as directed. Contact your healthcare provider if you think your medicine is not helping or if you have side effects. Tell your provider if you are allergic to any medicine. Keep a list of the medicines, vitamins, and herbs you take. Include the amounts, and when and why you take them. Bring the list or the pill bottles to follow-up visits.  Carry your medicine list with you in case of an emergency. Manage dermatitis:   Apply a cool compress to your rash. This will help soothe your skin. Apply lotions or creams to the area. These help keep your skin moist and decrease itching. Apply the lotion or cream right after a lukewarm bath or shower when your skin is still damp. Use products that do not contain dye or a scent. Avoid skin irritants. Examples include makeup, hair products, soaps, and cleansers. Use products that do not contain a scent or dye. Follow up with your doctor or dermatologist as directed:  Write down your questions so you remember to ask them during your visits. © Copyright [facility] 2023 Information is for End User's use only and may not be sold, redistributed or otherwise used for commercial purposes. The above information is an  only. It is not intended as medical advice for individual conditions or treatments. Talk to your doctor, nurse or pharmacist before following any medical regimen to see if it is safe and effective for you. Fluid In The Ear (Serous Otitis Media)   WHAT YOU NEED TO KNOW:   MACY is fluid trapped in the middle of your ear behind your eardrum. This condition usually develops without signs or symptoms of an ear infection. Serous otitis media may be caused by an upper respiratory infection or allergies. It is most common in the fall and early spring. DISCHARGE INSTRUCTIONS:   Call your doctor if:   You develop severe ear pain. The outside of your ear is red or swollen. You have fluid coming from your ear. You have questions or concerns about your condition or care. How to stay healthy:   Wash your hands often throughout the day. Use soap and water. Rub your soapy hands together, lacing your fingers, for at least 20 seconds. Rinse with warm, running water. Dry your hands with a clean towel or paper towel. Use hand  that contains alcohol if soap and water are not available.  Teach children how to wash their hands and use hand . Avoid people who are sick. Some germs are easily and quickly spread through contact. Follow up with your doctor as directed:  Write down your questions so you remember to ask them during your visits. © Copyright [employer] 2023 Information is for End User's use only and may not be sold, redistributed or otherwise used for commercial purposes. The above information is an  only. It is not intended as medical advice for individual conditions or treatments. Talk to your doctor, nurse or pharmacist before following any medical regimen to see if it is safe and effective for you. Follow up with PCP in 3-5 days. Proceed to  ER if symptoms worsen. Chief Complaint     Chief Complaint   Patient presents with    Ear Problem     Bit by a spider 7 years ago and has had issues every year since then. Per patient, twice a year her left ear swells up (where she was bit) along with tender to touch pain and a lump that forms in from of her ear on cheek bone and lower in her neck she gets another lump. She wanted it to be seen while it is flared up right now. History of Present Illness       Patient complains of pain swelling and tenderness left ear for the past day. Patient states this is a recurrent problem since a spider bite several years ago. Patient has never before sought medical evaluation as she claims symptoms have always resolved in the past before she got in to a doctor. Review of Systems   Review of Systems   Constitutional:  Negative for chills and fever. HENT:  Positive for ear pain. Negative for ear discharge, rhinorrhea, sinus pressure, sore throat, tinnitus, trouble swallowing and voice change. Respiratory:  Negative for cough, chest tightness, shortness of breath and wheezing. Skin:  Positive for color change. Neurological:  Negative for dizziness and light-headedness.          Current Medications       Current Outpatient Medications:     predniSONE 10 mg tablet, Take once daily all days pills on this schedule 6- 6- 5- 4- 3- 2- 1, Disp: 27 tablet, Rfl: 0    buPROPion (WELLBUTRIN XL) 150 mg 24 hr tablet, Take 150 mg by mouth daily, Disp: , Rfl:     ibuprofen (MOTRIN) 600 mg tablet, Take 1 tablet (600 mg total) by mouth every 8 (eight) hours as needed for mild pain for up to 28 days, Disp: 90 tablet, Rfl: 0    meloxicam (Mobic) 15 mg tablet, Take 1 tablet (15 mg total) by mouth daily (Patient not taking: Reported on 12/8/2021), Disp: 30 tablet, Rfl: 1    methylPREDNISolone 4 MG tablet therapy pack, Use as directed on package (Patient not taking: Reported on 12/8/2021), Disp: 1 each, Rfl: 1    methylPREDNISolone 4 MG tablet therapy pack, Use as directed on package (Patient not taking: Reported on 2/28/2023), Disp: 1 each, Rfl: 1    Orilissa 150 MG TABS, Take 1 tablet by mouth daily (Patient not taking: Reported on 2/28/2023), Disp: 90 tablet, Rfl: 1    Current Allergies     Allergies as of 12/01/2023 - Reviewed 12/01/2023   Allergen Reaction Noted    Erythromycin Hives 05/27/2016    Other  08/13/2018            The following portions of the patient's history were reviewed and updated as appropriate: allergies, current medications, past family history, past medical history, past social history, past surgical history and problem list.     Past Medical History:   Diagnosis Date    Abnormal Papanicolaou smear of cervix     Asthma     Endometriosis 02/06/2020    Kidney stones     Left breast lump     Papanicolaou smear for cervical cancer screening 2016       Past Surgical History:   Procedure Laterality Date    CHOLECYSTECTOMY      TUBAL LIGATION         Family History   Problem Relation Age of Onset    Diabetes Mother     COPD Mother     Other Mother         Multiple breast cysts     Diabetes Father     Heart disease Father     Heart failure Father     No Known Problems Daughter     No Known Problems Maternal Grandmother     No Known Problems Maternal Grandfather     No Known Problems Paternal Grandmother     No Known Problems Paternal Grandfather     Breast cancer Maternal Aunt     Breast cancer Maternal Aunt          Medications have been verified. Objective   /80   Pulse 66   Temp (!) 97.3 °F (36.3 °C)   Resp 18   Ht 5' 5.5" (1.664 m)   Wt 118 kg (260 lb 9.3 oz)   LMP 11/25/2023 (Exact Date)   SpO2 97%   BMI 42.70 kg/m²        Physical Exam     Physical Exam  Vitals and nursing note reviewed. Constitutional:       General: She is not in acute distress. Appearance: She is well-developed. She is not ill-appearing. HENT:      Head: Normocephalic and atraumatic. Ears:      Comments: Small clear effusions bilaterally, no erythema of TMs. Nose: Mucosal edema present. Mouth/Throat:      Pharynx: No oropharyngeal exudate or posterior oropharyngeal erythema. Tonsils: No tonsillar abscesses. Eyes:      Conjunctiva/sclera: Conjunctivae normal.      Pupils: Pupils are equal, round, and reactive to light. Cardiovascular:      Rate and Rhythm: Normal rate and regular rhythm. Pulmonary:      Effort: Pulmonary effort is normal.      Breath sounds: Normal breath sounds. Abdominal:      General: Bowel sounds are normal.      Palpations: Abdomen is soft. Musculoskeletal:      Cervical back: Normal range of motion and neck supple. Skin:     General: Skin is warm and dry. Findings: Erythema present. Comments: Mild erythema, mild swelling left auricle   Neurological:      Mental Status: She is alert and oriented to person, place, and time. Psychiatric:         Mood and Affect: Mood normal.         Behavior: Behavior normal.         Thought Content:  Thought content normal.         Judgment: Judgment normal.

## 2024-02-21 PROBLEM — R05.9 COUGH: Status: RESOLVED | Noted: 2018-08-13 | Resolved: 2024-02-21

## 2024-03-14 ENCOUNTER — HOSPITAL ENCOUNTER (OUTPATIENT)
Dept: RADIOLOGY | Facility: IMAGING CENTER | Age: 44
End: 2024-03-14
Payer: COMMERCIAL

## 2024-03-14 VITALS — BODY MASS INDEX: 41.78 KG/M2 | HEIGHT: 66 IN | WEIGHT: 260 LBS

## 2024-03-14 DIAGNOSIS — Z12.31 ENCOUNTER FOR SCREENING MAMMOGRAM FOR MALIGNANT NEOPLASM OF BREAST: ICD-10-CM

## 2024-03-14 PROCEDURE — 77067 SCR MAMMO BI INCL CAD: CPT

## 2024-03-14 PROCEDURE — 77063 BREAST TOMOSYNTHESIS BI: CPT

## 2024-04-02 ENCOUNTER — HOSPITAL ENCOUNTER (OUTPATIENT)
Dept: ULTRASOUND IMAGING | Facility: CLINIC | Age: 44
Discharge: HOME | End: 2024-04-02
Payer: COMMERCIAL

## 2024-04-02 DIAGNOSIS — R92.8 ABNORMAL SCREENING MAMMOGRAM: ICD-10-CM

## 2024-04-02 PROCEDURE — 76642 ULTRASOUND BREAST LIMITED: CPT

## 2024-05-24 DIAGNOSIS — Z00.6 ENCOUNTER FOR EXAMINATION FOR NORMAL COMPARISON OR CONTROL IN CLINICAL RESEARCH PROGRAM: ICD-10-CM

## 2024-05-29 ENCOUNTER — APPOINTMENT (OUTPATIENT)
Age: 44
End: 2024-05-29
Payer: COMMERCIAL

## 2024-05-29 DIAGNOSIS — J45.909 ASTHMATIC BRONCHITIS WITHOUT COMPLICATION, UNSPECIFIED ASTHMA SEVERITY, UNSPECIFIED WHETHER PERSISTENT: ICD-10-CM

## 2024-05-29 DIAGNOSIS — E66.01 MORBID OBESITY (HCC): ICD-10-CM

## 2024-05-29 DIAGNOSIS — Z00.6 ENCOUNTER FOR EXAMINATION FOR NORMAL COMPARISON OR CONTROL IN CLINICAL RESEARCH PROGRAM: ICD-10-CM

## 2024-05-29 LAB
ANION GAP SERPL CALCULATED.3IONS-SCNC: 8 MMOL/L (ref 4–13)
BUN SERPL-MCNC: 16 MG/DL (ref 5–25)
CALCIUM SERPL-MCNC: 8.5 MG/DL (ref 8.4–10.2)
CHLORIDE SERPL-SCNC: 103 MMOL/L (ref 96–108)
CO2 SERPL-SCNC: 28 MMOL/L (ref 21–32)
CREAT SERPL-MCNC: 0.75 MG/DL (ref 0.6–1.3)
GFR SERPL CREATININE-BSD FRML MDRD: 97 ML/MIN/1.73SQ M
GLUCOSE P FAST SERPL-MCNC: 107 MG/DL (ref 65–99)
POTASSIUM SERPL-SCNC: 4.6 MMOL/L (ref 3.5–5.3)
SODIUM SERPL-SCNC: 139 MMOL/L (ref 135–147)

## 2024-05-29 PROCEDURE — 36415 COLL VENOUS BLD VENIPUNCTURE: CPT

## 2024-05-29 PROCEDURE — 80048 BASIC METABOLIC PNL TOTAL CA: CPT

## 2024-06-15 LAB
APOB+LDLR+PCSK9 GENE MUT ANL BLD/T: NOT DETECTED
BRCA1+BRCA2 DEL+DUP + FULL MUT ANL BLD/T: NOT DETECTED
MLH1+MSH2+MSH6+PMS2 GN DEL+DUP+FUL M: NOT DETECTED

## 2024-10-09 ENCOUNTER — HOSPITAL ENCOUNTER (OUTPATIENT)
Dept: ULTRASOUND IMAGING | Facility: CLINIC | Age: 44
Discharge: HOME | End: 2024-10-09
Payer: COMMERCIAL

## 2024-10-09 DIAGNOSIS — R92.8 ABNORMAL FINDINGS ON DIAGNOSTIC IMAGING OF BREAST: ICD-10-CM

## 2024-10-09 PROCEDURE — 76642 ULTRASOUND BREAST LIMITED: CPT

## 2025-02-28 ENCOUNTER — OFFICE VISIT (OUTPATIENT)
Age: 45
End: 2025-02-28
Payer: COMMERCIAL

## 2025-02-28 VITALS
DIASTOLIC BLOOD PRESSURE: 66 MMHG | BODY MASS INDEX: 41.95 KG/M2 | RESPIRATION RATE: 16 BRPM | OXYGEN SATURATION: 98 % | HEART RATE: 120 BPM | WEIGHT: 261 LBS | SYSTOLIC BLOOD PRESSURE: 120 MMHG | TEMPERATURE: 99.4 F | HEIGHT: 66 IN

## 2025-02-28 DIAGNOSIS — R68.89 FLU-LIKE SYMPTOMS: Primary | ICD-10-CM

## 2025-02-28 PROCEDURE — 87636 SARSCOV2 & INF A&B AMP PRB: CPT | Performed by: EMERGENCY MEDICINE

## 2025-02-28 PROCEDURE — 99213 OFFICE O/P EST LOW 20 MIN: CPT | Performed by: EMERGENCY MEDICINE

## 2025-02-28 RX ORDER — PREDNISONE 10 MG/1
TABLET ORAL
Qty: 21 TABLET | Refills: 0 | Status: SHIPPED | OUTPATIENT
Start: 2025-02-28

## 2025-02-28 RX ORDER — ALBUTEROL SULFATE 90 UG/1
2 INHALANT RESPIRATORY (INHALATION) EVERY 6 HOURS PRN
Qty: 8.5 G | Refills: 0 | Status: SHIPPED | OUTPATIENT
Start: 2025-02-28

## 2025-02-28 NOTE — PATIENT INSTRUCTIONS
Flulike illness    You have been diagnosed with a Flu like illness and your symptoms should resolve over the next 7 to 10 days with the treatments recommended today.  If they do not, it is possible that you have developed a bacterial infection and you should return. If you were to take an antibiotic while you are still in the viral stage, you will not get better any faster, but could kill off many of the good germs in your body as well as make the germs in you resistant to the antibiotic.    Take an expectorant - guaifenesin should be the only ingredient - during the day, and the cough suppressant (ex. Robitussin DM or Tessalon) if needed at night only.   Take Zinc 25 mg every 12 hours for the next week (the dose is important so do not just take a multivitamin with zinc or an over-the-counter cold med with zinc such as Airborne or Zicam, as that will not give you the sufficient dose).    You should also take vitamin D3 5000 i.u.s per day for the next 1 week, and vitamin-C 1 g twice daily (and again dosages are important, do not think you are getting enough vitamin C just by drinking extra orange juice).  May take Flonase as discussed.  You may also take a decongestant like Sudafed, unless you have hypertension or cardiac disease.  You may take Imodium for diarrhea according to package instructions.     If you are diabetic you should adhere strictly to your diabetic diet and monitor blood sugar closely while on prednisone and you should discontinue the prednisone if blood sugar becomes significantly elevated.  Avoid nonsteroidal anti-inflammatories like Advil or Aleve while on prednisone.     Although bothersome, mucous is not necessarily a bad thing. Production of mucous is the body's way of trying to capture and flush irritants from mucosal surfaces. Yellow or green mucous does not necessarily mean you have a bacterial infection. Mucous will become more discolored over time, especially first thing in the morning,  as your body's immune system floods the mucosal surfaces with white bloods cells to try and help fight infection. This white blood cell debride can also cause mucous to be discolored. Again, using nasal saline spray frequently may help soothe and keep mucous flowing out versus getting dried, thickened and / or stuck leading to more sinus pain and pressure.      If you have a cough, please realize that a cough is not necessarily a bad thing. It is a part of your body's protection mechanism to help keep your airways clear. Phlegm may be more discolored in the morning. Please note that discolored phlegm does not necessarily mean a bacterial infection.        Follow up with PCP in 3-5 days.  Proceed to ER if symptoms worsen.    AMBULATORY CARE:   Flu-like illness is an infection caused by a virus. The flu is easily spread when an infected person coughs, sneezes, or has close contact with others. You may be able to spread the flu to others for 1 week or longer after signs or symptoms appear.   Common signs and symptoms include the following:   Fever and chills    Headaches, body aches, and muscle or joint pain    Cough, runny nose, and sore throat    Loss of appetite, nausea, vomiting, or diarrhea    Tiredness    Trouble breathing  Call 911 for any of the following:   You have trouble breathing, and your lips look purple or blue.    You have a seizure.  Seek care immediately if:   You are dizzy, or you are urinating less or not at all.     You have a headache with a stiff neck, and you feel tired or confused.    You have new pain or pressure in your chest.    Your symptoms, such as shortness of breath, vomiting, or diarrhea, get worse.     Your symptoms, such as fever and coughing, seem to get better, but then get worse.  Contact your healthcare provider if:   You have new muscle pain or weakness.    You have questions or concerns about your condition or care.  Treatment for influenza  may include any of the  following:  Acetaminophen decreases pain and fever. It is available without a doctor's order. Ask how much to take and how often to take it. Follow directions. Acetaminophen can cause liver damage if not taken correctly.    NSAIDs  such as ibuprofen, help decrease swelling, pain, and fever. This medicine is available with or without a doctor's order. NSAIDs can cause stomach bleeding or kidney problems in certain people. If you take blood thinner medicine, always ask your healthcare provider if NSAIDs are safe for you. Always read the medicine label and follow directions.    Antivirals  help fight a viral infection.  Manage your symptoms:   Rest  as much as you can to help you recover.    Drink liquids as directed  to help prevent dehydration. Ask how much liquid to drink each day and which liquids are best for you.  Prevent the spread of the flu:   Wash your hands often.  Use soap and water. Wash your hands after you use the bathroom, change a child's diapers, or sneeze. Wash your hands before you prepare or eat food. Use gel hand cleanser when soap and water are not available. Do not touch your eyes, nose, or mouth unless you have washed your hands first.       Cover your mouth when you sneeze or cough.  Cough into a tissue or the bend of your arm.    Clean shared items with a germ-killing .  Clean table surfaces, doorknobs, and light switches. Do not share towels, silverware, and dishes with people who are sick. Wash bed sheets, towels, silverware, and dishes with soap and water.     Wear a mask  over your mouth and nose if you are sick or are near anyone who is sick.     Stay away from others  if you are sick.     Follow up with your healthcare provider as directed:  Write down your questions so you remember to ask them during your visits.   © 2017 [employer] Information is for End User's use only and may not be sold, redistributed or otherwise used for commercial purposes. All  illustrations and images included in CareNotes® are the copyrighted property of [employer] or [employer].  The above information is an  only. It is not intended as medical advice for individual conditions or treatments. Talk to your doctor, nurse or pharmacist before following any medical regimen to see if it is safe and effective for you.

## 2025-02-28 NOTE — PROGRESS NOTES
[facility]        NAME: Jolly Alatorre is a 44 y.o. female  : 1980    MRN: [id number]  DATE: 2025  TIME: 3:35 PM    Assessment and Plan   Flu-like symptoms [R68.89]  1. Flu-like symptoms  Covid19 and INFLUENZA A/B PCR    albuterol (ProAir HFA) 90 mcg/act inhaler    predniSONE 10 mg tablet            Patient Instructions     Patient Instructions   Flulike illness    You have been diagnosed with a Flu like illness and your symptoms should resolve over the next 7 to 10 days with the treatments recommended today.  If they do not, it is possible that you have developed a bacterial infection and you should return. If you were to take an antibiotic while you are still in the viral stage, you will not get better any faster, but could kill off many of the good germs in your body as well as make the germs in you resistant to the antibiotic.    Take an expectorant - guaifenesin should be the only ingredient - during the day, and the cough suppressant (ex. Robitussin DM or Tessalon) if needed at night only.   Take Zinc 25 mg every 12 hours for the next week (the dose is important so do not just take a multivitamin with zinc or an over-the-counter cold med with zinc such as Airborne or Zicam, as that will not give you the sufficient dose).    You should also take vitamin D3 5000 i.u.s per day for the next 1 week, and vitamin-C 1 g twice daily (and again dosages are important, do not think you are getting enough vitamin C just by drinking extra orange juice).  May take Flonase as discussed.  You may also take a decongestant like Sudafed, unless you have hypertension or cardiac disease.  You may take Imodium for diarrhea according to package instructions.     If you are diabetic you should adhere strictly to your diabetic diet and monitor blood sugar closely while on prednisone and you should discontinue the prednisone if blood sugar becomes significantly elevated.  Avoid nonsteroidal  anti-inflammatories like Advil or Aleve while on prednisone.     Although bothersome, mucous is not necessarily a bad thing. Production of mucous is the body's way of trying to capture and flush irritants from mucosal surfaces. Yellow or green mucous does not necessarily mean you have a bacterial infection. Mucous will become more discolored over time, especially first thing in the morning, as your body's immune system floods the mucosal surfaces with white bloods cells to try and help fight infection. This white blood cell debride can also cause mucous to be discolored. Again, using nasal saline spray frequently may help soothe and keep mucous flowing out versus getting dried, thickened and / or stuck leading to more sinus pain and pressure.      If you have a cough, please realize that a cough is not necessarily a bad thing. It is a part of your body's protection mechanism to help keep your airways clear. Phlegm may be more discolored in the morning. Please note that discolored phlegm does not necessarily mean a bacterial infection.        Follow up with PCP in 3-5 days.  Proceed to ER if symptoms worsen.    AMBULATORY CARE:   Flu-like illness is an infection caused by a virus. The flu is easily spread when an infected person coughs, sneezes, or has close contact with others. You may be able to spread the flu to others for 1 week or longer after signs or symptoms appear.   Common signs and symptoms include the following:   Fever and chills    Headaches, body aches, and muscle or joint pain    Cough, runny nose, and sore throat    Loss of appetite, nausea, vomiting, or diarrhea    Tiredness    Trouble breathing  Call 911 for any of the following:   You have trouble breathing, and your lips look purple or blue.    You have a seizure.  Seek care immediately if:   You are dizzy, or you are urinating less or not at all.     You have a headache with a stiff neck, and you feel tired or confused.    You have new pain or  pressure in your chest.    Your symptoms, such as shortness of breath, vomiting, or diarrhea, get worse.     Your symptoms, such as fever and coughing, seem to get better, but then get worse.  Contact your healthcare provider if:   You have new muscle pain or weakness.    You have questions or concerns about your condition or care.  Treatment for influenza  may include any of the following:  Acetaminophen decreases pain and fever. It is available without a doctor's order. Ask how much to take and how often to take it. Follow directions. Acetaminophen can cause liver damage if not taken correctly.    NSAIDs  such as ibuprofen, help decrease swelling, pain, and fever. This medicine is available with or without a doctor's order. NSAIDs can cause stomach bleeding or kidney problems in certain people. If you take blood thinner medicine, always ask your healthcare provider if NSAIDs are safe for you. Always read the medicine label and follow directions.    Antivirals  help fight a viral infection.  Manage your symptoms:   Rest  as much as you can to help you recover.    Drink liquids as directed  to help prevent dehydration. Ask how much liquid to drink each day and which liquids are best for you.  Prevent the spread of the flu:   Wash your hands often.  Use soap and water. Wash your hands after you use the bathroom, change a child's diapers, or sneeze. Wash your hands before you prepare or eat food. Use gel hand cleanser when soap and water are not available. Do not touch your eyes, nose, or mouth unless you have washed your hands first.       Cover your mouth when you sneeze or cough.  Cough into a tissue or the bend of your arm.    Clean shared items with a germ-killing .  Clean table surfaces, doorknobs, and light switches. Do not share towels, silverware, and dishes with people who are sick. Wash bed sheets, towels, silverware, and dishes with soap and water.     Wear a mask  over your mouth and nose if you  are sick or are near anyone who is sick.     Stay away from others  if you are sick.     Follow up with your healthcare provider as directed:  Write down your questions so you remember to ask them during your visits.   © 2017 [employer] Information is for End User's use only and may not be sold, redistributed or otherwise used for commercial purposes. All illustrations and images included in CareNotes® are the copyrighted property of [employer], [employer] or [employer].  The above information is an  only. It is not intended as medical advice for individual conditions or treatments. Talk to your doctor, nurse or pharmacist before following any medical regimen to see if it is safe and effective for you.        Follow up with PCP in 3-5 days.  Proceed to  ER if symptoms worsen.    Chief Complaint     Chief Complaint   Patient presents with    Flu Symptoms     Started last night. Fever of 102.2F. Took Tylenol. Body aches, fatigue, sinus and chest congestion, runny nose.          History of Present Illness       Patient complains of fevers, chills, headaches, generalized aches, cough, congestion, sore throat, wheezing, chest tightness since yesterday.    Cough  This is a new problem. The current episode started yesterday. The problem has been gradually worsening. The problem occurs every few minutes. The cough is Non-productive. Associated symptoms include chills, a fever, headaches, myalgias, nasal congestion, postnasal drip, rhinorrhea, a sore throat, shortness of breath, sweats and wheezing. Pertinent negatives include no chest pain, ear congestion, ear pain, heartburn, hemoptysis, rash or weight loss. The symptoms are aggravated by lying down. Risk factors for lung disease include smoking/tobacco exposure.       Review of Systems   Review of Systems   Constitutional:  Positive for chills and fever. Negative for weight loss.   HENT:  Positive for postnasal drip, rhinorrhea and  sore throat. Negative for ear pain.    Respiratory:  Positive for cough, shortness of breath and wheezing. Negative for hemoptysis.    Cardiovascular:  Negative for chest pain.   Gastrointestinal:  Negative for heartburn.   Musculoskeletal:  Positive for myalgias.   Skin:  Negative for rash.   Neurological:  Positive for headaches.         Current Medications       Current Outpatient Medications:     albuterol (ProAir HFA) 90 mcg/act inhaler, Inhale 2 puffs every 6 (six) hours as needed for shortness of breath May dispense 1 spacer, Disp: 8.5 g, Rfl: 0    predniSONE 10 mg tablet, Take once daily all days pills on this schedule  6- 5- 4- 3- 2- 1, Disp: 21 tablet, Rfl: 0    buPROPion (WELLBUTRIN XL) 150 mg 24 hr tablet, Take 150 mg by mouth daily, Disp: , Rfl:     ibuprofen (MOTRIN) 600 mg tablet, Take 1 tablet (600 mg total) by mouth every 8 (eight) hours as needed for mild pain for up to 28 days, Disp: 90 tablet, Rfl: 0    meloxicam (Mobic) 15 mg tablet, Take 1 tablet (15 mg total) by mouth daily (Patient not taking: Reported on 12/8/2021), Disp: 30 tablet, Rfl: 1    methylPREDNISolone 4 MG tablet therapy pack, Use as directed on package (Patient not taking: Reported on 12/8/2021), Disp: 1 each, Rfl: 1    methylPREDNISolone 4 MG tablet therapy pack, Use as directed on package (Patient not taking: Reported on 2/28/2023), Disp: 1 each, Rfl: 1    Orilissa 150 MG TABS, Take 1 tablet by mouth daily (Patient not taking: Reported on 2/28/2023), Disp: 90 tablet, Rfl: 1    predniSONE 10 mg tablet, Take once daily all days pills on this schedule 6- 6- 5- 4- 3- 2- 1, Disp: 27 tablet, Rfl: 0    Tirzepatide 10 MG/0.5ML SOAJ, , Disp: , Rfl:     Current Allergies     Allergies as of 02/28/2025 - Reviewed 02/28/2025   Allergen Reaction Noted    Erythromycin Hives 05/27/2016    Other  08/13/2018            The following portions of the patient's history were reviewed and updated as appropriate: allergies, current medications, past  "family history, past medical history, past social history, past surgical history and problem list.     Past Medical History:   Diagnosis Date    Abnormal Papanicolaou smear of cervix     Asthma     Endometriosis 02/06/2020    Kidney stones     Left breast lump     Papanicolaou smear for cervical cancer screening 2016       Past Surgical History:   Procedure Laterality Date    CHOLECYSTECTOMY      HYSTERECTOMY      TUBAL LIGATION         Family History   Problem Relation Age of Onset    Diabetes Mother     COPD Mother     Other Mother         Multiple breast cysts     Diabetes Father     Heart disease Father     Heart failure Father     No Known Problems Sister     No Known Problems Daughter     No Known Problems Maternal Grandmother     No Known Problems Maternal Grandfather     No Known Problems Paternal Grandmother     No Known Problems Paternal Grandfather     Breast cancer Maternal Aunt 45    Breast cancer Maternal Aunt     No Known Problems Son     No Known Problems Son          Medications have been verified.        Objective   /66 (Patient Position: Sitting)   Pulse (!) 120   Temp 99.4 °F (37.4 °C) (Tympanic)   Resp 16   Ht 5' 5.5\" (1.664 m)   Wt 118 kg (261 lb)   LMP 02/29/2024 (Approximate)   SpO2 98%   BMI 42.77 kg/m²        Physical Exam     Physical Exam  Vitals and nursing note reviewed.   Constitutional:       General: She is not in acute distress.     Appearance: She is well-developed. She is not ill-appearing or toxic-appearing.   HENT:      Head: Normocephalic and atraumatic.      Right Ear: External ear normal.      Left Ear: External ear normal.      Nose: Mucosal edema and congestion present.      Mouth/Throat:      Pharynx: Posterior oropharyngeal erythema present. No oropharyngeal exudate.      Tonsils: No tonsillar abscesses.   Cardiovascular:      Rate and Rhythm: Normal rate and regular rhythm.   Pulmonary:      Effort: Pulmonary effort is normal. No respiratory distress.      " Breath sounds: No wheezing, rhonchi or rales.   Musculoskeletal:      Cervical back: Neck supple.   Skin:     General: Skin is warm and dry.      Coloration: Skin is not pale.      Findings: No rash.   Neurological:      Mental Status: She is alert and oriented to person, place, and time.   Psychiatric:         Mood and Affect: Mood normal.         Behavior: Behavior normal.         Thought Content: Thought content normal.         Judgment: Judgment normal.

## 2025-03-01 ENCOUNTER — RESULTS FOLLOW-UP (OUTPATIENT)
Age: 45
End: 2025-03-01

## 2025-03-01 LAB
FLUAV RNA RESP QL NAA+PROBE: POSITIVE
FLUBV RNA RESP QL NAA+PROBE: NEGATIVE
SARS-COV-2 RNA RESP QL NAA+PROBE: NEGATIVE

## 2025-03-22 DIAGNOSIS — R68.89 FLU-LIKE SYMPTOMS: ICD-10-CM

## 2025-05-21 RX ORDER — ALBUTEROL SULFATE 90 UG/1
2 INHALANT RESPIRATORY (INHALATION) EVERY 6 HOURS PRN
OUTPATIENT
Start: 2025-05-21

## 2025-08-11 ENCOUNTER — HOSPITAL ENCOUNTER (OUTPATIENT)
Dept: RADIOLOGY | Facility: HOSPITAL | Age: 45
Discharge: HOME | End: 2025-08-11
Attending: INTERNAL MEDICINE
Payer: COMMERCIAL

## 2025-08-11 ENCOUNTER — HOSPITAL ENCOUNTER (OUTPATIENT)
Dept: MAMMOGRAPHY | Facility: MEDICAL CENTER | Age: 45
Discharge: HOME | End: 2025-08-11
Attending: INTERNAL MEDICINE
Payer: COMMERCIAL